# Patient Record
Sex: FEMALE | Race: BLACK OR AFRICAN AMERICAN | NOT HISPANIC OR LATINO | URBAN - METROPOLITAN AREA
[De-identification: names, ages, dates, MRNs, and addresses within clinical notes are randomized per-mention and may not be internally consistent; named-entity substitution may affect disease eponyms.]

---

## 2017-08-13 ENCOUNTER — INPATIENT (INPATIENT)
Facility: HOSPITAL | Age: 67
LOS: 3 days | Discharge: HOME HEALTH | End: 2017-08-17
Attending: HOSPITALIST | Admitting: HOSPITALIST
Payer: MEDICARE

## 2017-08-13 VITALS
SYSTOLIC BLOOD PRESSURE: 164 MMHG | RESPIRATION RATE: 22 BRPM | HEART RATE: 102 BPM | DIASTOLIC BLOOD PRESSURE: 103 MMHG | OXYGEN SATURATION: 100 % | TEMPERATURE: 99 F

## 2017-08-13 LAB
ALBUMIN SERPL ELPH-MCNC: 4.6 G/DL — SIGNIFICANT CHANGE UP (ref 3.3–5)
ALP SERPL-CCNC: 137 U/L — HIGH (ref 40–120)
ALT FLD-CCNC: 9 U/L — SIGNIFICANT CHANGE UP (ref 4–33)
AST SERPL-CCNC: 25 U/L — SIGNIFICANT CHANGE UP (ref 4–32)
BASE EXCESS BLDV CALC-SCNC: 2.6 MMOL/L — SIGNIFICANT CHANGE UP
BASOPHILS # BLD AUTO: 0.04 K/UL — SIGNIFICANT CHANGE UP (ref 0–0.2)
BASOPHILS NFR BLD AUTO: 0.5 % — SIGNIFICANT CHANGE UP (ref 0–2)
BILIRUB SERPL-MCNC: 0.4 MG/DL — SIGNIFICANT CHANGE UP (ref 0.2–1.2)
BLOOD GAS VENOUS - CREATININE: 0.73 MG/DL — SIGNIFICANT CHANGE UP (ref 0.5–1.3)
BUN SERPL-MCNC: 7 MG/DL — SIGNIFICANT CHANGE UP (ref 7–23)
CALCIUM SERPL-MCNC: 9.9 MG/DL — SIGNIFICANT CHANGE UP (ref 8.4–10.5)
CHLORIDE BLDV-SCNC: 106 MMOL/L — SIGNIFICANT CHANGE UP (ref 96–108)
CHLORIDE SERPL-SCNC: 103 MMOL/L — SIGNIFICANT CHANGE UP (ref 98–107)
CO2 SERPL-SCNC: 25 MMOL/L — SIGNIFICANT CHANGE UP (ref 22–31)
CREAT SERPL-MCNC: 0.84 MG/DL — SIGNIFICANT CHANGE UP (ref 0.5–1.3)
EOSINOPHIL # BLD AUTO: 1.51 K/UL — HIGH (ref 0–0.5)
EOSINOPHIL NFR BLD AUTO: 18 % — HIGH (ref 0–6)
GAS PNL BLDV: 142 MMOL/L — SIGNIFICANT CHANGE UP (ref 136–146)
GLUCOSE BLDV-MCNC: 99 — SIGNIFICANT CHANGE UP (ref 70–99)
GLUCOSE SERPL-MCNC: 92 MG/DL — SIGNIFICANT CHANGE UP (ref 70–99)
HCO3 BLDV-SCNC: 25 MMOL/L — SIGNIFICANT CHANGE UP (ref 20–27)
HCT VFR BLD CALC: 47.4 % — HIGH (ref 34.5–45)
HCT VFR BLDV CALC: 48.3 % — HIGH (ref 34.5–45)
HGB BLD-MCNC: 15.2 G/DL — SIGNIFICANT CHANGE UP (ref 11.5–15.5)
HGB BLDV-MCNC: 15.8 G/DL — HIGH (ref 11.5–15.5)
IMM GRANULOCYTES # BLD AUTO: 0.02 # — SIGNIFICANT CHANGE UP
IMM GRANULOCYTES NFR BLD AUTO: 0.2 % — SIGNIFICANT CHANGE UP (ref 0–1.5)
LACTATE BLDV-MCNC: 2 MMOL/L — SIGNIFICANT CHANGE UP (ref 0.5–2)
LYMPHOCYTES # BLD AUTO: 3.19 K/UL — SIGNIFICANT CHANGE UP (ref 1–3.3)
LYMPHOCYTES # BLD AUTO: 38.1 % — SIGNIFICANT CHANGE UP (ref 13–44)
MCHC RBC-ENTMCNC: 28 PG — SIGNIFICANT CHANGE UP (ref 27–34)
MCHC RBC-ENTMCNC: 32.1 % — SIGNIFICANT CHANGE UP (ref 32–36)
MCV RBC AUTO: 87.5 FL — SIGNIFICANT CHANGE UP (ref 80–100)
MONOCYTES # BLD AUTO: 0.52 K/UL — SIGNIFICANT CHANGE UP (ref 0–0.9)
MONOCYTES NFR BLD AUTO: 6.2 % — SIGNIFICANT CHANGE UP (ref 2–14)
NEUTROPHILS # BLD AUTO: 3.09 K/UL — SIGNIFICANT CHANGE UP (ref 1.8–7.4)
NEUTROPHILS NFR BLD AUTO: 37 % — LOW (ref 43–77)
NRBC # FLD: 0 — SIGNIFICANT CHANGE UP
PCO2 BLDV: 51 MMHG — SIGNIFICANT CHANGE UP (ref 41–51)
PH BLDV: 7.36 PH — SIGNIFICANT CHANGE UP (ref 7.32–7.43)
PLATELET # BLD AUTO: 254 K/UL — SIGNIFICANT CHANGE UP (ref 150–400)
PMV BLD: 10.1 FL — SIGNIFICANT CHANGE UP (ref 7–13)
PO2 BLDV: 31 MMHG — LOW (ref 35–40)
POTASSIUM BLDV-SCNC: 4 MMOL/L — SIGNIFICANT CHANGE UP (ref 3.4–4.5)
POTASSIUM SERPL-MCNC: 4.4 MMOL/L — SIGNIFICANT CHANGE UP (ref 3.5–5.3)
POTASSIUM SERPL-SCNC: 4.4 MMOL/L — SIGNIFICANT CHANGE UP (ref 3.5–5.3)
PROT SERPL-MCNC: 8 G/DL — SIGNIFICANT CHANGE UP (ref 6–8.3)
RBC # BLD: 5.42 M/UL — HIGH (ref 3.8–5.2)
RBC # FLD: 13 % — SIGNIFICANT CHANGE UP (ref 10.3–14.5)
SAO2 % BLDV: 51.4 % — LOW (ref 60–85)
SODIUM SERPL-SCNC: 144 MMOL/L — SIGNIFICANT CHANGE UP (ref 135–145)
WBC # BLD: 8.37 K/UL — SIGNIFICANT CHANGE UP (ref 3.8–10.5)
WBC # FLD AUTO: 8.37 K/UL — SIGNIFICANT CHANGE UP (ref 3.8–10.5)

## 2017-08-13 RX ORDER — IPRATROPIUM/ALBUTEROL SULFATE 18-103MCG
3 AEROSOL WITH ADAPTER (GRAM) INHALATION ONCE
Qty: 0 | Refills: 0 | Status: COMPLETED | OUTPATIENT
Start: 2017-08-13 | End: 2017-08-13

## 2017-08-13 RX ORDER — NICARDIPINE HYDROCHLORIDE 30 MG/1
2.5 CAPSULE, EXTENDED RELEASE ORAL
Qty: 40 | Refills: 0 | Status: DISCONTINUED | OUTPATIENT
Start: 2017-08-13 | End: 2017-08-13

## 2017-08-13 RX ORDER — MAGNESIUM SULFATE 500 MG/ML
2 VIAL (ML) INJECTION ONCE
Qty: 0 | Refills: 0 | Status: COMPLETED | OUTPATIENT
Start: 2017-08-13 | End: 2017-08-13

## 2017-08-13 RX ORDER — SODIUM CHLORIDE 9 MG/ML
1000 INJECTION INTRAMUSCULAR; INTRAVENOUS; SUBCUTANEOUS ONCE
Qty: 0 | Refills: 0 | Status: COMPLETED | OUTPATIENT
Start: 2017-08-13 | End: 2017-08-13

## 2017-08-13 RX ADMIN — Medication 3 MILLILITER(S): at 22:48

## 2017-08-13 RX ADMIN — Medication 60 MILLIGRAM(S): at 22:17

## 2017-08-13 RX ADMIN — Medication 3 MILLILITER(S): at 22:17

## 2017-08-13 RX ADMIN — Medication 3 MILLILITER(S): at 22:35

## 2017-08-13 RX ADMIN — Medication 50 GRAM(S): at 22:39

## 2017-08-13 RX ADMIN — SODIUM CHLORIDE 1000 MILLILITER(S): 9 INJECTION INTRAMUSCULAR; INTRAVENOUS; SUBCUTANEOUS at 22:35

## 2017-08-13 NOTE — ED ADULT NURSE NOTE - CHIEF COMPLAINT QUOTE
Pt arrives w/ c/o severe asthma exacerbation. Pt unable to speak in full sentences. Audible wheezes noted on arrival to triage. Respirations appear labored. Placed 4L nasal cannula.

## 2017-08-13 NOTE — ED ADULT TRIAGE NOTE - CHIEF COMPLAINT QUOTE
Pt arrives w/ c/o severe asthma exacerbation. Pt unable to speak in full sentences. Audible wheezes noted on arrival to triage. Respirations appear labored. Pt arrives w/ c/o severe asthma exacerbation. Pt unable to speak in full sentences. Audible wheezes noted on arrival to triage. Respirations appear labored. Placed 4L nasal cannula.

## 2017-08-13 NOTE — ED PROVIDER NOTE - ATTENDING CONTRIBUTION TO CARE
uriel: hx from pt and family.   hx of difficult to manage asthma, with adherence to pharm regimen ??.  Current meds include advair and GWEN. not currently on pred or montelukast. 2 prior ICU admits; no hx invasive ventilation. hx of monthly exacerbations.   Now with worsening sx past 2 weeks, incl wheeze/shortness of breath.   exam: after GWEN, pt talks with minimal difficulty. bilateral wheeze. , [id number].  labs and cxr pending. pt receiving BD, steroids, MgSO4

## 2017-08-13 NOTE — ED PROVIDER NOTE - MEDICAL DECISION MAKING DETAILS
66F w/ above history p/w productive cough, generalized weakness, wheezing, concerning for asthma exac  -labs, XR, nebs, steroids, magnesium, admit

## 2017-08-13 NOTE — ED PROVIDER NOTE - PROGRESS NOTE DETAILS
Feeling better following medications, respiratory exam improved, still has diffuse wheezing. XR clear, labs unremarkable, will admit, PMD not in system, d/w hospitatlist

## 2017-08-13 NOTE — ED PROVIDER NOTE - CONSTITUTIONAL, MLM
normal... speaking in short sentences, well nourished, awake, alert, oriented to person, place, time/situation

## 2017-08-13 NOTE — ED PROVIDER NOTE - PSH
H/O hand surgery  1980's right  H/O mastectomy, bilateral    S/P D&C (status post dilation and curettage)  x 2 many years ago

## 2017-08-13 NOTE — ED PROVIDER NOTE - OBJECTIVE STATEMENT
66F h/o HTN, HLD, BRCA s/p double mastectomy, asthma presenting with wheezing. Notes feeling generalized weakness, productive cough (white,) dyspnea, wheezing intermittently over past week, today has been using albuterol q3h at home. Has h/o 2 ICU stays, averages 1 ER visit/month for asthma. Denies F, abd pain, N/V/D.

## 2017-08-13 NOTE — ED ADULT NURSE NOTE - OBJECTIVE STATEMENT
Ambulatory accompanied by daughter complaining of SOB and non-compliance with medications. 89% in triage, now on 3L NC in trauma B and saturating @97%. Nebulizer ongoing. A/ox3, ambulatory at home. Patient states that she is feeling better now that she is getting treated. BL wheezes. MD at bedside. 22g IV inserted to L hand, fluids infusing well. Safety maintained, needs attended, will continue to monitor.

## 2017-08-14 DIAGNOSIS — R63.8 OTHER SYMPTOMS AND SIGNS CONCERNING FOOD AND FLUID INTAKE: ICD-10-CM

## 2017-08-14 DIAGNOSIS — Z29.9 ENCOUNTER FOR PROPHYLACTIC MEASURES, UNSPECIFIED: ICD-10-CM

## 2017-08-14 DIAGNOSIS — J45.901 UNSPECIFIED ASTHMA WITH (ACUTE) EXACERBATION: ICD-10-CM

## 2017-08-14 DIAGNOSIS — I10 ESSENTIAL (PRIMARY) HYPERTENSION: ICD-10-CM

## 2017-08-14 DIAGNOSIS — J45.909 UNSPECIFIED ASTHMA, UNCOMPLICATED: ICD-10-CM

## 2017-08-14 PROCEDURE — 71020: CPT | Mod: 26

## 2017-08-14 RX ORDER — IPRATROPIUM/ALBUTEROL SULFATE 18-103MCG
3 AEROSOL WITH ADAPTER (GRAM) INHALATION ONCE
Qty: 0 | Refills: 0 | Status: COMPLETED | OUTPATIENT
Start: 2017-08-14 | End: 2017-08-14

## 2017-08-14 RX ORDER — ENOXAPARIN SODIUM 100 MG/ML
40 INJECTION SUBCUTANEOUS DAILY
Qty: 0 | Refills: 0 | Status: DISCONTINUED | OUTPATIENT
Start: 2017-08-14 | End: 2017-08-17

## 2017-08-14 RX ORDER — IPRATROPIUM/ALBUTEROL SULFATE 18-103MCG
3 AEROSOL WITH ADAPTER (GRAM) INHALATION EVERY 6 HOURS
Qty: 0 | Refills: 0 | Status: DISCONTINUED | OUTPATIENT
Start: 2017-08-14 | End: 2017-08-17

## 2017-08-14 RX ADMIN — Medication 3 MILLILITER(S): at 15:31

## 2017-08-14 RX ADMIN — Medication 3 MILLILITER(S): at 11:42

## 2017-08-14 RX ADMIN — Medication 3 MILLILITER(S): at 18:48

## 2017-08-14 RX ADMIN — Medication 3 MILLILITER(S): at 22:39

## 2017-08-14 RX ADMIN — Medication 40 MILLIGRAM(S): at 16:05

## 2017-08-14 NOTE — H&P ADULT - FAMILY HISTORY
Father  Still living? Unknown  Family history of cancer, Age at diagnosis: Age Unknown  Family history of asthma, Age at diagnosis: Age Unknown

## 2017-08-14 NOTE — H&P ADULT - NSHPSOCIALHISTORY_GEN_ALL_CORE
Social History:    Marital Status:  (   )    ( x  ) Single    (   )    (  )   Occupation:   Lives with: (  ) alone  ( x ) children   (  ) spouse   (  ) parents  (  ) other    Substance Use (street drugs): ( x ) never used  (  ) other:  Tobacco Usage:  ( x  ) never smoked   (   ) former smoker   (   ) current smoker  (     ) pack year  (        ) last cigarette date  Alcohol Usage: none   Sexual History: n/a

## 2017-08-14 NOTE — PROVIDER CONTACT NOTE (OTHER) - REASON
Pt. reports coughing with SOB after ambulating the hallway
Blood pressure of 176/105. Oxygen saturation of 93.

## 2017-08-14 NOTE — H&P ADULT - ATTENDING COMMENTS
H&P appreciated.   she is less dyspneic but still with wheezing in all lung fields.  c/w steroids, nebs.  will f/u eosinophilia keeping in mind other potential etiologies (CEP, AEP, Churgg frank)

## 2017-08-14 NOTE — H&P ADULT - NSHPPHYSICALEXAM_GEN_ALL_CORE
Vital Signs Last 24 Hrs  T(C): 36.5 (08-14-17 @ 05:17), Max: 37.2 (08-13-17 @ 21:43)  T(F): 97.7 (08-14-17 @ 05:17), Max: 99 (08-13-17 @ 21:43)  HR: 88 (08-14-17 @ 05:17) (84 - 102)  BP: 152/76 (08-14-17 @ 05:17) (132/71 - 176/105)  BP(mean): --  RR: 17 (08-14-17 @ 05:17) (17 - 24)  SpO2: 98% (08-14-17 @ 05:17) (89% - 98%)    PHYSICAL EXAM:  GENERAL: NAD  HEAD:  Atraumatic, Normocephalic  EYES: EOMI, PERRLA, conjunctiva and sclera clear  ENMT: No tonsillar erythema, exudates, or enlargement; Moist mucous membranes, Good dentition, No lesions  NECK: Supple, No JVD, Normal thyroid  CHEST/LUNG: wheezes B/L, mastectomy scars   HEART: Regular rate and rhythm; No murmurs, rubs, or gallops  ABDOMEN: Soft, Nontender, Nondistended; Bowel sounds present  EXTREMITIES:  2+ Peripheral Pulses, No clubbing, cyanosis, or edema  LYMPH: No lymphadenopathy noted  SKIN: mastectomy scars

## 2017-08-14 NOTE — PROVIDER CONTACT NOTE (OTHER) - ASSESSMENT
Blood pressure of 176/105. Oxygen saturation of 93.
Pt. AOx4, NC 2L applied, O2 sat. 97%, coughing with productive sputum production

## 2017-08-14 NOTE — H&P ADULT - NSHPREVIEWOFSYSTEMS_GEN_ALL_CORE
endorses SOB, possible cough     denies fever, chills, night sweats, nasal congestion, chest pain, palpations, nausea/vomiting, abd. pain, diarrhea, constipation, dysuria, urinary frequency, leg swelling, joint pain, and rashes.

## 2017-08-14 NOTE — H&P ADULT - NSHPLABSRESULTS_GEN_ALL_CORE
08-13    144  |  103  |  7   ----------------------------<  92  4.4   |  25  |  0.84    Ca    9.9      13 Aug 2017 22:26    TPro  8.0  /  Alb  4.6  /  TBili  0.4  /  DBili  x   /  AST  25  /  ALT  9   /  AlkPhos  137<H>  08-13               15.2   8.37  )-----------( 254      ( 13 Aug 2017 22:26 )             47.4     18% eosinophils    VBG:  pH 7.36  pCO2 venous 51 (normal)   pO2 venous 31 (low)  HCO3 25 (normal)

## 2017-08-14 NOTE — H&P ADULT - ASSESSMENT
66F w/ BRCA s/p B/L mastectomy in 2015, HTN, HLD presenting with wheezing and SOB, 2/2 possible to asthma exacerbation

## 2017-08-14 NOTE — H&P ADULT - HISTORY OF PRESENT ILLNESS
66 F with hx of BRCA (s/p B/L mastectomy 2015), HTN, HLD 66 F with hx of BRCA (s/p B/L mastectomy 2015), HTN, HLD presents with sudden shortness of breath and increased respirations possibly 2/2 asthma.  Pt reports that she was in her usual state of health on Friday when she got up and suddenly felt SOB and began to breathe very quickly.  Pt states that she immediately started using her albuterol inhaler (two puffs) and albuterol nebulizer, and given that she felt an impending state of doom, she called her daughter who brought her into the ED.      Regarding her asthma, she reports that has never been intubated, but that she has been hospitalized before.  She uses Dulera and Albuterol every day, and was nebulous about whether or not she wakes up at night, denying and then affirming.  Pt reports that her triggers include exercise, allergens, weather changes, fumes, cigarette smoke.  The pt states that her building is currently being renovated and that her breathing has been worse since it begun - there is dust from the construction, paint fumes, and trees are being cut.      Otherwise, no other constitutional sxs - no f/c/n/v/diarrhea/constipation/dysuria    In the ED:  T 99, , 164/103 66 F from [facility] with hx of BRCA (s/p B/L mastectomy 2015), HTN, HLD presents with sudden shortness of breath and increased respirations possibly 2/2 asthma.  Pt reports that she was in her usual state of health on Friday when she got up, opened the window and "dust came in" and suddenly felt SOB and began to breathe very quickly.  Pt states that she immediately started using her albuterol inhaler (two puffs) and albuterol nebulizer, and given that she felt an impending state of doom, she called her daughter who brought her into the ED.      Regarding her asthma, she reports that has never been intubated, but that she has been hospitalized before - has not been hospitalized for asthma in 2 years, goes to see PMD in clinic.  She uses Dulera and Albuterol every day, has used prednisone in the past, and was nebulous about whether or not she wakes up at night, denying and then affirming.  Pt reports that her triggers include exercise, allergens, weather changes, fumes, cigarette smoke.  The pt states that her building is currently being renovated and that her breathing has been worse since it begun - there is dust from the construction, paint fumes, and trees are being cut.      Otherwise, no other constitutional sxs - no f/c/n/v/diarrhea/constipation/dysuria    In the ED:  T 99, , 164/103  Magnesium sulfate 2 g, Prednisone 60 mg one dose

## 2017-08-14 NOTE — H&P ADULT - PROBLEM SELECTOR PLAN 1
As per pt, has hx of asthma exacerbation and known triggers since 2007  - c/w Duonebs and supportive care   - O2 NC   - will touch base w/ outpatient PMD Dr. DEVANTE Worrell  - can consult pulm if necessary As per pt, has hx of asthma exacerbation and known triggers since 2007, possibly c/b her anxiety   - c/w Duonebs and supportive care   - O2 NC   - will touch base w/ outpatient PMD Dr. DEVANTE Worrell  - would benefit from outpatient pulmonologist - unlikely to need house pulm at the time As per pt, has hx of asthma exacerbation and known triggers since 2007, possibly c/b her anxiety - ddx of wheezing is extensive including anaphylaxis, vocal cord edema, laryngeal/tracheal stenosis, goiter, post nasal drip, parasites, and occupational asthma - given her picture, asthma is most likely  - c/w Duonebs and supportive care   - O2 NC   - will touch base w/ outpatient PMD Dr. DEVANTE Worrell  - would benefit from outpatient pulmonologist - unlikely to need house pulm at the time

## 2017-08-15 ENCOUNTER — TRANSCRIPTION ENCOUNTER (OUTPATIENT)
Age: 67
End: 2017-08-15

## 2017-08-15 LAB
ALBUMIN SERPL ELPH-MCNC: 4.3 G/DL — SIGNIFICANT CHANGE UP (ref 3.3–5)
ALP SERPL-CCNC: 119 U/L — SIGNIFICANT CHANGE UP (ref 40–120)
ALT FLD-CCNC: 8 U/L — SIGNIFICANT CHANGE UP (ref 4–33)
AST SERPL-CCNC: 20 U/L — SIGNIFICANT CHANGE UP (ref 4–32)
BASOPHILS # BLD AUTO: 0.02 K/UL — SIGNIFICANT CHANGE UP (ref 0–0.2)
BASOPHILS NFR BLD AUTO: 0.3 % — SIGNIFICANT CHANGE UP (ref 0–2)
BILIRUB SERPL-MCNC: 0.3 MG/DL — SIGNIFICANT CHANGE UP (ref 0.2–1.2)
BUN SERPL-MCNC: 11 MG/DL — SIGNIFICANT CHANGE UP (ref 7–23)
CALCIUM SERPL-MCNC: 9.7 MG/DL — SIGNIFICANT CHANGE UP (ref 8.4–10.5)
CHLORIDE SERPL-SCNC: 102 MMOL/L — SIGNIFICANT CHANGE UP (ref 98–107)
CO2 SERPL-SCNC: 23 MMOL/L — SIGNIFICANT CHANGE UP (ref 22–31)
CREAT SERPL-MCNC: 0.9 MG/DL — SIGNIFICANT CHANGE UP (ref 0.5–1.3)
EOSINOPHIL # BLD AUTO: 0.03 K/UL — SIGNIFICANT CHANGE UP (ref 0–0.5)
EOSINOPHIL NFR BLD AUTO: 0.4 % — SIGNIFICANT CHANGE UP (ref 0–6)
GLUCOSE SERPL-MCNC: 133 MG/DL — HIGH (ref 70–99)
HCT VFR BLD CALC: 45.5 % — HIGH (ref 34.5–45)
HGB BLD-MCNC: 14.6 G/DL — SIGNIFICANT CHANGE UP (ref 11.5–15.5)
IMM GRANULOCYTES # BLD AUTO: 0.03 # — SIGNIFICANT CHANGE UP
IMM GRANULOCYTES NFR BLD AUTO: 0.4 % — SIGNIFICANT CHANGE UP (ref 0–1.5)
LYMPHOCYTES # BLD AUTO: 2.26 K/UL — SIGNIFICANT CHANGE UP (ref 1–3.3)
LYMPHOCYTES # BLD AUTO: 32.4 % — SIGNIFICANT CHANGE UP (ref 13–44)
MAGNESIUM SERPL-MCNC: 2.2 MG/DL — SIGNIFICANT CHANGE UP (ref 1.6–2.6)
MCHC RBC-ENTMCNC: 28.2 PG — SIGNIFICANT CHANGE UP (ref 27–34)
MCHC RBC-ENTMCNC: 32.1 % — SIGNIFICANT CHANGE UP (ref 32–36)
MCV RBC AUTO: 88 FL — SIGNIFICANT CHANGE UP (ref 80–100)
MONOCYTES # BLD AUTO: 0.52 K/UL — SIGNIFICANT CHANGE UP (ref 0–0.9)
MONOCYTES NFR BLD AUTO: 7.4 % — SIGNIFICANT CHANGE UP (ref 2–14)
NEUTROPHILS # BLD AUTO: 4.12 K/UL — SIGNIFICANT CHANGE UP (ref 1.8–7.4)
NEUTROPHILS NFR BLD AUTO: 59.1 % — SIGNIFICANT CHANGE UP (ref 43–77)
NRBC # FLD: 0 — SIGNIFICANT CHANGE UP
PHOSPHATE SERPL-MCNC: 3.7 MG/DL — SIGNIFICANT CHANGE UP (ref 2.5–4.5)
PLATELET # BLD AUTO: 256 K/UL — SIGNIFICANT CHANGE UP (ref 150–400)
PMV BLD: 10.4 FL — SIGNIFICANT CHANGE UP (ref 7–13)
POTASSIUM SERPL-MCNC: 4.1 MMOL/L — SIGNIFICANT CHANGE UP (ref 3.5–5.3)
POTASSIUM SERPL-SCNC: 4.1 MMOL/L — SIGNIFICANT CHANGE UP (ref 3.5–5.3)
PROT SERPL-MCNC: 7.4 G/DL — SIGNIFICANT CHANGE UP (ref 6–8.3)
RBC # BLD: 5.17 M/UL — SIGNIFICANT CHANGE UP (ref 3.8–5.2)
RBC # FLD: 13 % — SIGNIFICANT CHANGE UP (ref 10.3–14.5)
SODIUM SERPL-SCNC: 141 MMOL/L — SIGNIFICANT CHANGE UP (ref 135–145)
WBC # BLD: 6.98 K/UL — SIGNIFICANT CHANGE UP (ref 3.8–10.5)
WBC # FLD AUTO: 6.98 K/UL — SIGNIFICANT CHANGE UP (ref 3.8–10.5)

## 2017-08-15 PROCEDURE — 99233 SBSQ HOSP IP/OBS HIGH 50: CPT | Mod: GC

## 2017-08-15 RX ORDER — BUDESONIDE AND FORMOTEROL FUMARATE DIHYDRATE 160; 4.5 UG/1; UG/1
2 AEROSOL RESPIRATORY (INHALATION)
Qty: 0 | Refills: 0 | Status: DISCONTINUED | OUTPATIENT
Start: 2017-08-15 | End: 2017-08-16

## 2017-08-15 RX ADMIN — Medication 3 MILLILITER(S): at 09:19

## 2017-08-15 RX ADMIN — BUDESONIDE AND FORMOTEROL FUMARATE DIHYDRATE 2 PUFF(S): 160; 4.5 AEROSOL RESPIRATORY (INHALATION) at 22:55

## 2017-08-15 RX ADMIN — Medication 3 MILLILITER(S): at 21:45

## 2017-08-15 RX ADMIN — Medication 3 MILLILITER(S): at 04:14

## 2017-08-15 RX ADMIN — ENOXAPARIN SODIUM 40 MILLIGRAM(S): 100 INJECTION SUBCUTANEOUS at 12:35

## 2017-08-15 RX ADMIN — Medication 40 MILLIGRAM(S): at 05:28

## 2017-08-15 RX ADMIN — Medication 100 MILLIGRAM(S): at 16:32

## 2017-08-15 RX ADMIN — Medication 3 MILLILITER(S): at 16:07

## 2017-08-15 NOTE — DISCHARGE NOTE ADULT - MEDICATION SUMMARY - MEDICATIONS TO TAKE
I will START or STAY ON the medications listed below when I get home from the hospital:    nebulizer  -- 1 nebulizer machine to use with albuterol   -- Indication: For Asthma     Dulera 100 mcg-5 mcg/inh inhalation aerosol  -- 2 puff(s) inhaled 2 times a day  -- Indication: For Asthma     ProAir HFA 90 mcg/inh inhalation aerosol  -- 2 puff(s) inhaled prn  -- Indication: For Asthma     amLODIPine 5 mg oral tablet  -- 1 tab(s) by mouth once a day  -- Indication: For HTN (hypertension)    montelukast 10 mg oral tablet  -- 1 tab(s) by mouth once a day  -- It is very important that you take or use this exactly as directed.  Do not skip doses or discontinue unless directed by your doctor.    -- Indication: For Asthma

## 2017-08-15 NOTE — PROGRESS NOTE ADULT - ATTENDING COMMENTS
she wanted to leave but now agrees to stay.  she still has significant bronchospasm with a PF just over 50% of her best.    will add LABA and ICS. proper inhaler technique was demonstrated.

## 2017-08-15 NOTE — DISCHARGE NOTE ADULT - HOSPITAL COURSE
HPI:  66 F from [facility] with hx of BRCA (s/p B/L mastectomy 2015), HTN, HLD presents with sudden shortness of breath and increased respirations possibly 2/2 asthma.  Pt reports that she was in her usual state of health on Friday when she got up, opened the window and "dust came in" and suddenly felt SOB and began to breathe very quickly.  Pt states that she immediately started using her albuterol inhaler (two puffs) and albuterol nebulizer, and given that she felt an impending state of doom, she called her daughter who brought her into the ED.      Regarding her asthma, she reports that has never been intubated, but that she has been hospitalized before - has not been hospitalized for asthma in 2 years, goes to see PMD in clinic.  She uses Dulera and Albuterol every day, has used prednisone in the past, and was nebulous about whether or not she wakes up at night, denying and then affirming.  Pt reports that her triggers include exercise, allergens, weather changes, fumes, cigarette smoke.  The pt states that her building is currently being renovated and that her breathing has been worse since it begun - there is dust from the construction, paint fumes, and trees are being cut.      Otherwise, no other constitutional sxs - no f/c/n/v/diarrhea/constipation/dysuria    Hospital course:  The pt was admitted for suspected asthma exacerbation with possible complications from a URI.  The pt has a known outpatient pulmonologist, Dr. Worrell, with whom she follows regularly.  In the hospital, the pt received 60 mg prednisone and was switched to 40 mg prednisone to treat suspected asthma exacerbation.  The pt also received supportive care with nebulizer treatment and supplemental oxygen, to which the pt did not respond, as she continued to have bilateral inspiratory and expiratory wheezes.  The pt refused RVP assessment.  The pt was not clinically stable for discharge, but left AMA. HPI:  66 F from [facility] with hx of BRCA (s/p B/L mastectomy 2015), HTN, HLD presents with sudden shortness of breath and increased respirations possibly 2/2 asthma.  Pt reports that she was in her usual state of health on Friday when she got up, opened the window and "dust came in" and suddenly felt SOB and began to breathe very quickly.  Pt states that she immediately started using her albuterol inhaler (two puffs) and albuterol nebulizer, and given that she felt an impending state of doom, she called her daughter who brought her into the ED.      Regarding her asthma, she reports that has never been intubated, but that she has been hospitalized before - has not been hospitalized for asthma in 2 years, goes to see PMD in clinic.  She uses Dulera and Albuterol every day, has used prednisone in the past, and was nebulous about whether or not she wakes up at night, denying and then affirming.  Pt reports that her triggers include exercise, allergens, weather changes, fumes, cigarette smoke.  The pt states that her building is currently being renovated and that her breathing has been worse since it begun - there is dust from the construction, paint fumes, and trees are being cut.      Otherwise, no other constitutional sxs - no f/c/n/v/diarrhea/constipation/dysuria    Hospital course:  The pt was admitted for suspected asthma exacerbation with possible complications from a URI.  The pt has a known outpatient pulmonologist, Dr. Worrell, with whom she follows regularly, and diagnosed her with asthma based on PFT studies.  In the hospital, the pt received 60 mg prednisone and was switched to 40 mg prednisone to treat suspected asthma exacerbation.  The pt also received supportive care with nebulizer treatment and supplemental oxygen, which reduced her wheezing throughout the hospital stay.  The pt was started back on Singulair and given refills for her home medications, including a prescription for a new nebulizer machine with a prescription for Albuterol 0.083%.  On the day of discharge, the pt's peak flow was 300 (improved from 200), had significantly reduced wheezing and good air exchange on examination.  The pt was stable for discharge, and was sent home with recommendations for outpatient follow up and avoidance of asthmatic triggers. HPI:  66 F from [facility] with hx of BRCA (s/p B/L mastectomy 2015), HTN, HLD presents with sudden shortness of breath and increased respirations possibly 2/2 asthma.  Pt reports that she was in her usual state of health on Friday when she got up, opened the window and "dust came in" and suddenly felt SOB and began to breathe very quickly.  Pt states that she immediately started using her albuterol inhaler (two puffs) and albuterol nebulizer, and given that she felt an impending state of doom, she called her daughter who brought her into the ED.      Regarding her asthma, she reports that has never been intubated, but that she has been hospitalized before - has not been hospitalized for asthma in 2 years, goes to see PMD in clinic.  She uses Dulera and Albuterol every day, has used prednisone in the past, and was nebulous about whether or not she wakes up at night, denying and then affirming.  Pt reports that her triggers include exercise, allergens, weather changes, fumes, cigarette smoke.  The pt states that her building is currently being renovated and that her breathing has been worse since it begun - there is dust from the construction, paint fumes, and trees are being cut.      Otherwise, no other constitutional sxs - no f/c/n/v/diarrhea/constipation/dysuria    Hospital course:  The pt was admitted for suspected asthma exacerbation with possible complications from a URI.  The pt has a known outpatient pulmonologist, Dr. Worrell, with whom she follows regularly, and diagnosed her with asthma based on PFT studies.  In the hospital, the pt received 60 mg prednisone and was switched to 40 mg prednisone to treat suspected asthma exacerbation.  The pt also received supportive care with nebulizer treatment and supplemental oxygen, which reduced her wheezing throughout the hospital stay.  The pt was started back on Singulair and given refills for her home medications, including a prescription for a new nebulizer machine with a prescription for Albuterol 0.083%.  On the day of discharge, the pt's peak flow was 300 (improved from 200), had significantly reduced wheezing and good air exchange on examination.  The pt was stable for discharge, and was sent home with recommendations for outpatient follow up and avoidance of asthmatic triggers.    medicine attending addendum- she was admitted with status asthmaticus. finally "broke" after 4 days of nebs, steroids.  proper MDI technique was reviewed and treatment plans discussed based on disease activity.

## 2017-08-15 NOTE — DISCHARGE NOTE ADULT - ADDITIONAL INSTRUCTIONS
Please follow up with Dr. Worrell.  Please take your medications as prescribed. Please follow up with Dr. Worrell.  Please take your medications as prescribed.  Please remain indoors in air conditioning away from irritants and allergens, especially on hot and humid days.  Please continue to use the peak flow and if your score is less than 200, please return to the hospital.  Please use your asthma inhalers as instructed.

## 2017-08-15 NOTE — DISCHARGE NOTE ADULT - CARE PLAN
Principal Discharge DX:	Asthma exacerbation  Goal:	resolve  Instructions for follow-up, activity and diet:	You were admitted with a suspected asthma exacerbation.  You were treated with prednisone 40 mg and nebulizer treatment with supplemental oxygen.  Please follow up with your outpatient pulmonologist.  Secondary Diagnosis:	HTN (hypertension)  Goal:	maintain  Instructions for follow-up, activity and diet:	Please follow up with your outpatient primary care physician.  Please take your medications as prescribed. Principal Discharge DX:	Asthma exacerbation  Goal:	resolve  Instructions for follow-up, activity and diet:	You were admitted with a suspected asthma exacerbation.  You were treated with prednisone 40 mg and nebulizer treatment with supplemental oxygen.  Please take your medications as prescribed.  Please follow up with your outpatient pulmonologist.  Secondary Diagnosis:	HTN (hypertension)  Goal:	maintain  Instructions for follow-up, activity and diet:	Please follow up with your outpatient primary care physician.  Please take your medications as prescribed.

## 2017-08-15 NOTE — DISCHARGE NOTE ADULT - COMMUNITY RESOURCES
EMELIAP aide from  [facility]  [phone] MAGDY aide from  [facility]  [phone] [facility]  [phone] MAGDY aide from  [facility]  [phone] / [facility]  [phone]

## 2017-08-15 NOTE — PROGRESS NOTE ADULT - SUBJECTIVE AND OBJECTIVE BOX
Subjective:    Patient is a 66y old  Female who presents with a chief complaint of asthmatic wheezing (14 Aug 2017 09:03)      HPI:       MEDICATIONS  (STANDING):  ALBUTerol/ipratropium for Nebulization 3 milliLiter(s) Nebulizer every 6 hours  predniSONE   Tablet 40 milliGRAM(s) Oral daily  enoxaparin Injectable 40 milliGRAM(s) SubCutaneous daily    MEDICATIONS  (PRN):        Objective:    Vitals: Vital Signs Last 24 Hrs  T(C): 36.7 (08-15-17 @ 05:11), Max: 37.1 (08-14-17 @ 22:37)  T(F): 98.1 (08-15-17 @ 05:11), Max: 98.8 (08-14-17 @ 22:37)  HR: 73 (08-15-17 @ 05:11) (72 - 98)  BP: 125/69 (08-15-17 @ 05:11) (125/69 - 152/92)  BP(mean): --  RR: 17 (08-15-17 @ 05:11) (17 - 19)  SpO2: 98% (08-15-17 @ 05:11) (97% - 99%)            I&O's Summary      PHYSICAL EXAM:  GENERAL: NAD, well-groomed, well-developed  CHEST/LUNG: b/l inspiratory and expiratory wheezes   HEART: Regular rate and rhythm; No murmurs, rubs, or gallops  ABDOMEN: Soft, Nontender, Nondistended; Bowel sounds present                                        LABS:  08-15    141  |  102  |  11  ----------------------------<  133<H>  4.1   |  23  |  0.90  08-13    144  |  103  |  7   ----------------------------<  92  4.4   |  25  |  0.84    Ca    9.7      15 Aug 2017 06:15  Ca    9.9      13 Aug 2017 22:26  Phos  3.7     08-15  Mg     2.2     08-15    TPro  7.4  /  Alb  4.3  /  TBili  0.3  /  DBili  x   /  AST  20  /  ALT  8   /  AlkPhos  119  08-15  TPro  8.0  /  Alb  4.6  /  TBili  0.4  /  DBili  x   /  AST  25  /  ALT  9   /  AlkPhos  137<H>  08-13               14.6   6.98  )-----------( 256      ( 15 Aug 2017 06:15 )             45.5                         15.2   8.37  )-----------( 254      ( 13 Aug 2017 22:26 )             47.4

## 2017-08-15 NOTE — DISCHARGE NOTE ADULT - CONDITION (STATED IN TERMS THAT PERMIT A SPECIFIC MEASURABLE COMPARISON WITH CONDITION ON ADMISSION):
Pt had an asthma exacerbation possibly c/b URI.  Pt was not stable for discharge, as she continued to have b/l inspiratory and expiratory wheezes, but pt opted to leave AMA. Pt had an asthma exacerbation possibly c/b URI.  Pt was stable for discharge with outpatient pulmonary followup.

## 2017-08-15 NOTE — PROGRESS NOTE ADULT - PROBLEM SELECTOR PLAN 1
Pt has hx of asthma diagnosed in 07-08 by pulmonologist Dr. Worrell; possibly c/b URI given that pt is coughing, but has refused RVP for 2 days to confirm - + RVP would explain wheezing not responsive to bronchodilator treatment  - c/w Joel and supportive care   - O2 NC

## 2017-08-15 NOTE — DISCHARGE NOTE ADULT - PATIENT PORTAL LINK FT
“You can access the Follow[facility] Patient Portal, offered by [facility], by registering with the following website: [email or web address]”

## 2017-08-15 NOTE — DISCHARGE NOTE ADULT - NSFTFSERV1RD_GEN_ALL_CORE
observation and assessment observation and assessment/medication teaching and assessment/teaching and training

## 2017-08-16 LAB
BASOPHILS # BLD AUTO: 0.04 K/UL — SIGNIFICANT CHANGE UP (ref 0–0.2)
BASOPHILS NFR BLD AUTO: 0.6 % — SIGNIFICANT CHANGE UP (ref 0–2)
BUN SERPL-MCNC: 11 MG/DL — SIGNIFICANT CHANGE UP (ref 7–23)
CALCIUM SERPL-MCNC: 9.2 MG/DL — SIGNIFICANT CHANGE UP (ref 8.4–10.5)
CHLORIDE SERPL-SCNC: 102 MMOL/L — SIGNIFICANT CHANGE UP (ref 98–107)
CO2 SERPL-SCNC: 25 MMOL/L — SIGNIFICANT CHANGE UP (ref 22–31)
CREAT SERPL-MCNC: 0.83 MG/DL — SIGNIFICANT CHANGE UP (ref 0.5–1.3)
EOSINOPHIL # BLD AUTO: 0.62 K/UL — HIGH (ref 0–0.5)
EOSINOPHIL NFR BLD AUTO: 9.9 % — HIGH (ref 0–6)
GLUCOSE SERPL-MCNC: 97 MG/DL — SIGNIFICANT CHANGE UP (ref 70–99)
HCT VFR BLD CALC: 42.7 % — SIGNIFICANT CHANGE UP (ref 34.5–45)
HGB BLD-MCNC: 13.7 G/DL — SIGNIFICANT CHANGE UP (ref 11.5–15.5)
IMM GRANULOCYTES # BLD AUTO: 0.01 # — SIGNIFICANT CHANGE UP
IMM GRANULOCYTES NFR BLD AUTO: 0.2 % — SIGNIFICANT CHANGE UP (ref 0–1.5)
LYMPHOCYTES # BLD AUTO: 2.58 K/UL — SIGNIFICANT CHANGE UP (ref 1–3.3)
LYMPHOCYTES # BLD AUTO: 41 % — SIGNIFICANT CHANGE UP (ref 13–44)
MAGNESIUM SERPL-MCNC: 2.1 MG/DL — SIGNIFICANT CHANGE UP (ref 1.6–2.6)
MCHC RBC-ENTMCNC: 28.5 PG — SIGNIFICANT CHANGE UP (ref 27–34)
MCHC RBC-ENTMCNC: 32.1 % — SIGNIFICANT CHANGE UP (ref 32–36)
MCV RBC AUTO: 88.8 FL — SIGNIFICANT CHANGE UP (ref 80–100)
MONOCYTES # BLD AUTO: 0.49 K/UL — SIGNIFICANT CHANGE UP (ref 0–0.9)
MONOCYTES NFR BLD AUTO: 7.8 % — SIGNIFICANT CHANGE UP (ref 2–14)
NEUTROPHILS # BLD AUTO: 2.55 K/UL — SIGNIFICANT CHANGE UP (ref 1.8–7.4)
NEUTROPHILS NFR BLD AUTO: 40.5 % — LOW (ref 43–77)
NRBC # FLD: 0 — SIGNIFICANT CHANGE UP
PHOSPHATE SERPL-MCNC: 4.1 MG/DL — SIGNIFICANT CHANGE UP (ref 2.5–4.5)
PLATELET # BLD AUTO: 229 K/UL — SIGNIFICANT CHANGE UP (ref 150–400)
PMV BLD: 10.3 FL — SIGNIFICANT CHANGE UP (ref 7–13)
POTASSIUM SERPL-MCNC: 3.8 MMOL/L — SIGNIFICANT CHANGE UP (ref 3.5–5.3)
POTASSIUM SERPL-SCNC: 3.8 MMOL/L — SIGNIFICANT CHANGE UP (ref 3.5–5.3)
RBC # BLD: 4.81 M/UL — SIGNIFICANT CHANGE UP (ref 3.8–5.2)
RBC # FLD: 13.2 % — SIGNIFICANT CHANGE UP (ref 10.3–14.5)
SODIUM SERPL-SCNC: 141 MMOL/L — SIGNIFICANT CHANGE UP (ref 135–145)
WBC # BLD: 6.29 K/UL — SIGNIFICANT CHANGE UP (ref 3.8–10.5)
WBC # FLD AUTO: 6.29 K/UL — SIGNIFICANT CHANGE UP (ref 3.8–10.5)

## 2017-08-16 PROCEDURE — 12345: CPT | Mod: NC

## 2017-08-16 PROCEDURE — 99232 SBSQ HOSP IP/OBS MODERATE 35: CPT | Mod: GC

## 2017-08-16 RX ORDER — BUDESONIDE, MICRONIZED 100 %
0.5 POWDER (GRAM) MISCELLANEOUS
Qty: 0 | Refills: 0 | Status: DISCONTINUED | OUTPATIENT
Start: 2017-08-16 | End: 2017-08-17

## 2017-08-16 RX ORDER — ALBUTEROL 90 UG/1
2.5 AEROSOL, METERED ORAL EVERY 4 HOURS
Qty: 0 | Refills: 0 | Status: DISCONTINUED | OUTPATIENT
Start: 2017-08-16 | End: 2017-08-17

## 2017-08-16 RX ORDER — BUDESONIDE, MICRONIZED 100 %
0.25 POWDER (GRAM) MISCELLANEOUS
Qty: 0 | Refills: 0 | Status: DISCONTINUED | OUTPATIENT
Start: 2017-08-16 | End: 2017-08-16

## 2017-08-16 RX ORDER — MONTELUKAST 4 MG/1
10 TABLET, CHEWABLE ORAL DAILY
Qty: 0 | Refills: 0 | Status: DISCONTINUED | OUTPATIENT
Start: 2017-08-16 | End: 2017-08-17

## 2017-08-16 RX ADMIN — Medication 3 MILLILITER(S): at 10:52

## 2017-08-16 RX ADMIN — MONTELUKAST 10 MILLIGRAM(S): 4 TABLET, CHEWABLE ORAL at 11:41

## 2017-08-16 RX ADMIN — Medication 3 MILLILITER(S): at 03:25

## 2017-08-16 RX ADMIN — Medication 100 MILLIGRAM(S): at 08:55

## 2017-08-16 RX ADMIN — Medication 3 MILLILITER(S): at 20:50

## 2017-08-16 RX ADMIN — Medication 40 MILLIGRAM(S): at 05:40

## 2017-08-16 RX ADMIN — Medication 0.5 MILLIGRAM(S): at 20:56

## 2017-08-16 RX ADMIN — BUDESONIDE AND FORMOTEROL FUMARATE DIHYDRATE 2 PUFF(S): 160; 4.5 AEROSOL RESPIRATORY (INHALATION) at 08:55

## 2017-08-16 RX ADMIN — Medication 3 MILLILITER(S): at 15:51

## 2017-08-16 NOTE — PROGRESS NOTE ADULT - ATTENDING COMMENTS
peak flow is still only 250 (best is 450) and she still has wheezing albeit decreased.  will convert MDI's to nebs as  question of technique.  add LTRA.

## 2017-08-16 NOTE — PROGRESS NOTE ADULT - SUBJECTIVE AND OBJECTIVE BOX
Subjective:    Patient is a 66y old  Female who presents with a chief complaint of asthmatic wheezing (15 Aug 2017 08:33) - no overnight events, bedside peak flow 200    MEDICATIONS  (STANDING):  ALBUTerol/ipratropium for Nebulization 3 milliLiter(s) Nebulizer every 6 hours  predniSONE   Tablet 40 milliGRAM(s) Oral daily  enoxaparin Injectable 40 milliGRAM(s) SubCutaneous daily  buDESOnide  80 MICROgram(s)/formoterol 4.5 MICROgram(s) Inhaler 2 Puff(s) Inhalation two times a day    MEDICATIONS  (PRN):  benzonatate 100 milliGRAM(s) Oral three times a day PRN Cough    Objective:    Vitals: Vital Signs Last 24 Hrs  T(C): 37.2 (08-16-17 @ 05:14), Max: 37.2 (08-15-17 @ 22:28)  T(F): 98.9 (08-16-17 @ 05:14), Max: 99 (08-15-17 @ 22:28)  HR: 71 (08-16-17 @ 05:14) (61 - 97)  BP: 137/67 (08-16-17 @ 05:14) (137/67 - 156/87)  BP(mean): --  RR: 18 (08-16-17 @ 05:14) (18 - 19)  SpO2: 100% (08-16-17 @ 05:14) (93% - 100%)            I&O's Summary      PHYSICAL EXAM:  GENERAL: NAD, well-groomed, well-developed  HEAD:  Atraumatic, Normocephalic  CHEST/LUNG: decreased insp/exp wheezes b/l as compared to yesterday   HEART: Regular rate and rhythm; No murmurs, rubs, or gallops  ABDOMEN: Soft, Nontender, Nondistended; Bowel sounds present                                        LABS:  08-16    141  |  102  |  11  ----------------------------<  97  3.8   |  25  |  0.83  08-15    141  |  102  |  11  ----------------------------<  133<H>  4.1   |  23  |  0.90  08-13    144  |  103  |  7   ----------------------------<  92  4.4   |  25  |  0.84    Ca    9.2      16 Aug 2017 05:20  Ca    9.7      15 Aug 2017 06:15  Ca    9.9      13 Aug 2017 22:26  Phos  4.1     08-16  Mg     2.1     08-16    TPro  7.4  /  Alb  4.3  /  TBili  0.3  /  DBili  x   /  AST  20  /  ALT  8   /  AlkPhos  119  08-15  TPro  8.0  /  Alb  4.6  /  TBili  0.4  /  DBili  x   /  AST  25  /  ALT  9   /  AlkPhos  137<H>  08-13                                              13.7   6.29  )-----------( 229      ( 16 Aug 2017 05:20 )             42.7                         14.6   6.98  )-----------( 256      ( 15 Aug 2017 06:15 )             45.5                         15.2   8.37  )-----------( 254      ( 13 Aug 2017 22:26 )             47.4     CAPILLARY BLOOD GLUCOSE          RADIOLOGY & ADDITIONAL TESTS:    Imaging Personally Reviewed:  [ ] YES  [ ] NO      Consultants involved in case:   Consultant(s) Notes Reviewed:  [ ] YES  [ ] NO:   Care Discussed with Consultants/Other Providers [ ] YES  [ ] NO Name band;

## 2017-08-16 NOTE — PROGRESS NOTE ADULT - PROBLEM SELECTOR PLAN 1
Pt has hx of asthma diagnosed in 07-08 by pulmonologist Dr. Worrell; clinically improved but still wheezing; peak flow at 200  - c/w Duonebs, added LABA/ICS and supportive care   - O2 NC

## 2017-08-17 VITALS — OXYGEN SATURATION: 94 %

## 2017-08-17 PROCEDURE — 99239 HOSP IP/OBS DSCHRG MGMT >30: CPT

## 2017-08-17 RX ORDER — ALBUTEROL 90 UG/1
1 AEROSOL, METERED ORAL
Qty: 1 | Refills: 1 | OUTPATIENT
Start: 2017-08-17 | End: 2017-10-15

## 2017-08-17 RX ORDER — MOMETASONE FUROATE AND FORMOTEROL FUMARATE DIHYDRATE 200; 5 UG/1; UG/1
2 AEROSOL RESPIRATORY (INHALATION)
Qty: 1 | Refills: 1 | OUTPATIENT
Start: 2017-08-17 | End: 2017-10-15

## 2017-08-17 RX ORDER — ALBUTEROL 90 UG/1
2.5 AEROSOL, METERED ORAL
Qty: 1 | Refills: 1 | OUTPATIENT
Start: 2017-08-17 | End: 2017-10-15

## 2017-08-17 RX ORDER — MONTELUKAST 4 MG/1
1 TABLET, CHEWABLE ORAL
Qty: 30 | Refills: 1 | OUTPATIENT
Start: 2017-08-17 | End: 2017-10-15

## 2017-08-17 RX ADMIN — ALBUTEROL 2.5 MILLIGRAM(S): 90 AEROSOL, METERED ORAL at 10:42

## 2017-08-17 RX ADMIN — Medication 40 MILLIGRAM(S): at 05:54

## 2017-08-17 RX ADMIN — Medication 3 MILLILITER(S): at 03:30

## 2017-08-17 RX ADMIN — Medication 0.5 MILLIGRAM(S): at 10:08

## 2017-08-17 RX ADMIN — MONTELUKAST 10 MILLIGRAM(S): 4 TABLET, CHEWABLE ORAL at 12:38

## 2017-08-17 RX ADMIN — Medication 0.5 MILLIGRAM(S): at 21:49

## 2017-08-17 RX ADMIN — Medication 3 MILLILITER(S): at 21:35

## 2017-08-17 RX ADMIN — Medication 100 MILLIGRAM(S): at 01:42

## 2017-08-17 RX ADMIN — Medication 3 MILLILITER(S): at 10:42

## 2017-08-17 NOTE — PROGRESS NOTE ADULT - PROBLEM SELECTOR PLAN 1
Pt has hx of asthma diagnosed in 07-08 by pulmonologist Dr. Worrell; clinically improved but still wheezing; peak flow at 210  - c/w Duonebs, added LABA/ICS in nebulizer form + Singulair   - O2 NC as needed

## 2017-08-17 NOTE — PROGRESS NOTE ADULT - SUBJECTIVE AND OBJECTIVE BOX
Subjective:    Patient is a 66y old  Female who presents with a chief complaint of asthmatic wheezing (15 Aug 2017 08:33) - no overnight events, still wheezing, peak flow at 210-220 this morning; pt is trying to use other peak flow, which is easier, in order to get a better number so that she can leave    MEDICATIONS  (STANDING):  ALBUTerol/ipratropium for Nebulization 3 milliLiter(s) Nebulizer every 6 hours  predniSONE   Tablet 40 milliGRAM(s) Oral daily  enoxaparin Injectable 40 milliGRAM(s) SubCutaneous daily  montelukast 10 milliGRAM(s) Oral daily  buDESOnide   0.5 milliGRAM(s) Respule 0.5 milliGRAM(s) Inhalation two times a day    MEDICATIONS  (PRN):  benzonatate 100 milliGRAM(s) Oral three times a day PRN Cough  ALBUTerol    0.083% 2.5 milliGRAM(s) Nebulizer every 4 hours PRN Shortness of Breath and/or Wheezing    Objective:    Vitals: Vital Signs Last 24 Hrs  T(C): 36.4 (08-17-17 @ 05:57), Max: 36.8 (08-16-17 @ 22:26)  T(F): 97.6 (08-17-17 @ 05:57), Max: 98.3 (08-16-17 @ 22:26)  HR: 61 (08-17-17 @ 05:57) (61 - 85)  BP: 136/76 (08-17-17 @ 05:57) (136/76 - 140/76)  BP(mean): --  RR: 18 (08-17-17 @ 05:57) (18 - 18)  SpO2: 96% (08-17-17 @ 05:57) (93% - 99%)            I&O's Summary      PHYSICAL EXAM:  GENERAL: NAD, well-groomed, well-developed  CHEST/LUNG: wheezes bilaterally, more prominent on L than R  HEART: Regular rate and rhythm; No murmurs, rubs, or gallops  ABDOMEN: Soft, Nontender, Nondistended; Bowel sounds present                                         LABS:  08-16    141  |  102  |  11  ----------------------------<  97  3.8   |  25  |  0.83  08-15    141  |  102  |  11  ----------------------------<  133<H>  4.1   |  23  |  0.90    Ca    9.2      16 Aug 2017 05:20  Ca    9.7      15 Aug 2017 06:15  Phos  4.1     08-16  Mg     2.1     08-16    TPro  7.4  /  Alb  4.3  /  TBili  0.3  /  DBili  x   /  AST  20  /  ALT  8   /  AlkPhos  119  08-15                                              13.7   6.29  )-----------( 229      ( 16 Aug 2017 05:20 )             42.7                         14.6   6.98  )-----------( 256      ( 15 Aug 2017 06:15 )             45.5     CAPILLARY BLOOD GLUCOSE          RADIOLOGY & ADDITIONAL TESTS:    Imaging Personally Reviewed:  [ ] YES  [ ] NO      Consultants involved in case:   Consultant(s) Notes Reviewed:  [ ] YES  [ ] NO:   Care Discussed with Consultants/Other Providers [ ] YES  [ ] NO

## 2017-11-15 ENCOUNTER — EMERGENCY (EMERGENCY)
Facility: HOSPITAL | Age: 67
LOS: 1 days | Discharge: HOME | End: 2017-11-15
Admitting: EMERGENCY MEDICINE
Payer: MEDICARE

## 2017-11-15 VITALS
RESPIRATION RATE: 18 BRPM | HEART RATE: 87 BPM | OXYGEN SATURATION: 96 % | SYSTOLIC BLOOD PRESSURE: 168 MMHG | TEMPERATURE: 98 F | DIASTOLIC BLOOD PRESSURE: 74 MMHG

## 2017-11-15 VITALS
HEART RATE: 94 BPM | TEMPERATURE: 98 F | OXYGEN SATURATION: 94 % | DIASTOLIC BLOOD PRESSURE: 95 MMHG | SYSTOLIC BLOOD PRESSURE: 141 MMHG | RESPIRATION RATE: 16 BRPM

## 2017-11-15 LAB
ALBUMIN SERPL ELPH-MCNC: 4.5 G/DL — SIGNIFICANT CHANGE UP (ref 3.3–5)
ALP SERPL-CCNC: 135 U/L — HIGH (ref 40–120)
ALT FLD-CCNC: 11 U/L — SIGNIFICANT CHANGE UP (ref 4–33)
AST SERPL-CCNC: 25 U/L — SIGNIFICANT CHANGE UP (ref 4–32)
BASOPHILS # BLD AUTO: 0.03 K/UL — SIGNIFICANT CHANGE UP (ref 0–0.2)
BASOPHILS NFR BLD AUTO: 0.4 % — SIGNIFICANT CHANGE UP (ref 0–2)
BILIRUB SERPL-MCNC: 0.4 MG/DL — SIGNIFICANT CHANGE UP (ref 0.2–1.2)
BUN SERPL-MCNC: 10 MG/DL — SIGNIFICANT CHANGE UP (ref 7–23)
CALCIUM SERPL-MCNC: 9.7 MG/DL — SIGNIFICANT CHANGE UP (ref 8.4–10.5)
CHLORIDE SERPL-SCNC: 104 MMOL/L — SIGNIFICANT CHANGE UP (ref 98–107)
CO2 SERPL-SCNC: 23 MMOL/L — SIGNIFICANT CHANGE UP (ref 22–31)
COHGB MFR BLDV: 0.9 % — SIGNIFICANT CHANGE UP (ref 0.5–1.5)
COHGB MFR BLDV: 16.1 G/DL — HIGH (ref 11.5–15.5)
CREAT SERPL-MCNC: 0.91 MG/DL — SIGNIFICANT CHANGE UP (ref 0.5–1.3)
EOSINOPHIL # BLD AUTO: 0.12 K/UL — SIGNIFICANT CHANGE UP (ref 0–0.5)
EOSINOPHIL NFR BLD AUTO: 1.8 % — SIGNIFICANT CHANGE UP (ref 0–6)
GLUCOSE SERPL-MCNC: 93 MG/DL — SIGNIFICANT CHANGE UP (ref 70–99)
HCT VFR BLD CALC: 47.2 % — HIGH (ref 34.5–45)
HGB BLD-MCNC: 15.4 G/DL — SIGNIFICANT CHANGE UP (ref 11.5–15.5)
IMM GRANULOCYTES # BLD AUTO: 0.02 # — SIGNIFICANT CHANGE UP
IMM GRANULOCYTES NFR BLD AUTO: 0.3 % — SIGNIFICANT CHANGE UP (ref 0–1.5)
LYMPHOCYTES # BLD AUTO: 1.79 K/UL — SIGNIFICANT CHANGE UP (ref 1–3.3)
LYMPHOCYTES # BLD AUTO: 26.8 % — SIGNIFICANT CHANGE UP (ref 13–44)
MCHC RBC-ENTMCNC: 28.5 PG — SIGNIFICANT CHANGE UP (ref 27–34)
MCHC RBC-ENTMCNC: 32.6 % — SIGNIFICANT CHANGE UP (ref 32–36)
MCV RBC AUTO: 87.2 FL — SIGNIFICANT CHANGE UP (ref 80–100)
METHGB MFR BLDV: 1.1 % — SIGNIFICANT CHANGE UP (ref 0–1.5)
MONOCYTES # BLD AUTO: 0.44 K/UL — SIGNIFICANT CHANGE UP (ref 0–0.9)
MONOCYTES NFR BLD AUTO: 6.6 % — SIGNIFICANT CHANGE UP (ref 2–14)
NEUTROPHILS # BLD AUTO: 4.28 K/UL — SIGNIFICANT CHANGE UP (ref 1.8–7.4)
NEUTROPHILS NFR BLD AUTO: 64.1 % — SIGNIFICANT CHANGE UP (ref 43–77)
NRBC # FLD: 0 — SIGNIFICANT CHANGE UP
OXYHGB MFR BLDV: 51.9 % — LOW (ref 59–84)
PLATELET # BLD AUTO: 242 K/UL — SIGNIFICANT CHANGE UP (ref 150–400)
PMV BLD: 10.3 FL — SIGNIFICANT CHANGE UP (ref 7–13)
POTASSIUM SERPL-MCNC: 4.2 MMOL/L — SIGNIFICANT CHANGE UP (ref 3.5–5.3)
POTASSIUM SERPL-SCNC: 4.2 MMOL/L — SIGNIFICANT CHANGE UP (ref 3.5–5.3)
PROT SERPL-MCNC: 7.9 G/DL — SIGNIFICANT CHANGE UP (ref 6–8.3)
RBC # BLD: 5.41 M/UL — HIGH (ref 3.8–5.2)
RBC # FLD: 12.8 % — SIGNIFICANT CHANGE UP (ref 10.3–14.5)
SODIUM SERPL-SCNC: 145 MMOL/L — SIGNIFICANT CHANGE UP (ref 135–145)
WBC # BLD: 6.68 K/UL — SIGNIFICANT CHANGE UP (ref 3.8–10.5)
WBC # FLD AUTO: 6.68 K/UL — SIGNIFICANT CHANGE UP (ref 3.8–10.5)

## 2017-11-15 PROCEDURE — 71020: CPT | Mod: 26

## 2017-11-15 PROCEDURE — 99284 EMERGENCY DEPT VISIT MOD MDM: CPT | Mod: GC

## 2017-11-15 RX ORDER — IPRATROPIUM/ALBUTEROL SULFATE 18-103MCG
3 AEROSOL WITH ADAPTER (GRAM) INHALATION ONCE
Qty: 0 | Refills: 0 | Status: COMPLETED | OUTPATIENT
Start: 2017-11-15 | End: 2017-11-15

## 2017-11-15 RX ORDER — ALBUTEROL 90 UG/1
3 AEROSOL, METERED ORAL
Qty: 1 | Refills: 0 | OUTPATIENT
Start: 2017-11-15

## 2017-11-15 RX ADMIN — Medication 3 MILLILITER(S): at 20:04

## 2017-11-15 RX ADMIN — Medication 3 MILLILITER(S): at 19:45

## 2017-11-15 RX ADMIN — Medication 3 MILLILITER(S): at 20:46
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
## 2017-11-15 NOTE — ED PROVIDER NOTE - PROGRESS NOTE DETAILS
PHOENIX ALTMAN:  Pt endorses significant improvement in symptoms.  End expiratory wheeze on auscultation.  No accessory muscle usage.  Pt able to speak in long complete sentences without difficulty.  Peak flow at this time is 250 LPM.  Ambulated pt around Intake region for ~ 1 minute.  Pt tolerated exertion well.  Pt requesting to go home.  Strict return precautions given.  Will give prescription for nebulizer as pt requesting one.  Pt to follow up with PMD and pulmonary (referral list provided).
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
## 2017-11-15 NOTE — ED PROVIDER NOTE - MEDICAL DECISION MAKING DETAILS
Pt is a 66 y/o F former smoker PMHx asthma (no intubations; last admitted for exacerbation 8/17), HTN, HLD, h/o BRCA+, h/o bilateral mastectomy (2015) p/w asthma exacerbation x 3 days -- asthma exacerbation -- labs, cxr, albuterol

## 2017-11-15 NOTE — ED ADULT NURSE NOTE - OBJECTIVE STATEMENT
Bilateral wheezing noted.  Seen by PHOENIX Flowers.  Labs sent.  Completed 2 duoneb treatment.  X-ray done, wnl.  Pt feels much better, ambulating without sob.  Wheezing improved.   Pending dispo.

## 2017-11-15 NOTE — ED PROVIDER NOTE - PLAN OF CARE
Rest, drink plenty of fluids.  Advance activity as tolerated.  Continue all previously prescribed medications as directed.  Use Albuterol Inhaler 2 puffs every 4 to 6 hours as needed for shortness of breath and/or wheezing.  Complete course of prednisone as already prescribed to you.  Follow up with your primary care physician and pulmonary (referral list provided) in 48-72 hours- bring copies of your results.  Return to the ER for worsening or persistent symptoms, including but not limited to shortness of breath, wheezing, chest pain, passing out, fevers and/or ANY NEW OR CONCERNING SYMPTOMS. If you have issues obtaining follow up, please call: [phone] to obtain a doctor or specialist who takes your insurance in your area.

## 2017-11-15 NOTE — ED PROVIDER NOTE - NONTENDER LOCATION
right costovertebral angle/left upper quadrant/left lower quadrant/umbilical/suprapubic/left costovertebral angle/right lower quadrant/right upper quadrant/periumbilical

## 2017-11-15 NOTE — ED ADULT NURSE NOTE - CHIEF COMPLAINT
The patient is a 67y Female complaining of wheezing x 3 days, not improving after oral steroids, and proair, dulrera use.

## 2017-11-15 NOTE — ED PROVIDER NOTE - CARE PLAN
Principal Discharge DX:	Asthma exacerbation  Instructions for follow-up, activity and diet:	Rest, drink plenty of fluids.  Advance activity as tolerated.  Continue all previously prescribed medications as directed.  Use Albuterol Inhaler 2 puffs every 4 to 6 hours as needed for shortness of breath and/or wheezing.  Complete course of prednisone as already prescribed to you.  Follow up with your primary care physician and pulmonary (referral list provided) in 48-72 hours- bring copies of your results.  Return to the ER for worsening or persistent symptoms, including but not limited to shortness of breath, wheezing, chest pain, passing out, fevers and/or ANY NEW OR CONCERNING SYMPTOMS. If you have issues obtaining follow up, please call: [phone] to obtain a doctor or specialist who takes your insurance in your area.

## 2018-01-15 ENCOUNTER — EMERGENCY (EMERGENCY)
Facility: HOSPITAL | Age: 68
LOS: 1 days | Discharge: HOME | End: 2018-01-15
Attending: EMERGENCY MEDICINE | Admitting: EMERGENCY MEDICINE
Payer: MEDICARE

## 2018-01-15 VITALS
HEART RATE: 95 BPM | SYSTOLIC BLOOD PRESSURE: 155 MMHG | OXYGEN SATURATION: 92 % | TEMPERATURE: 100 F | DIASTOLIC BLOOD PRESSURE: 91 MMHG | RESPIRATION RATE: 24 BRPM

## 2018-01-15 PROCEDURE — 99285 EMERGENCY DEPT VISIT HI MDM: CPT | Mod: 25,GC

## 2018-01-15 RX ORDER — IPRATROPIUM/ALBUTEROL SULFATE 18-103MCG
3 AEROSOL WITH ADAPTER (GRAM) INHALATION ONCE
Qty: 0 | Refills: 0 | Status: COMPLETED | OUTPATIENT
Start: 2018-01-15 | End: 2018-01-15

## 2018-01-15 RX ADMIN — Medication 3 MILLILITER(S): at 23:05

## 2018-01-15 RX ADMIN — Medication 3 MILLILITER(S): at 23:04

## 2018-01-15 NOTE — ED ADULT TRIAGE NOTE - CHIEF COMPLAINT QUOTE
c/o worsening sob for past few weeks. coughing up white phlegm. + wheezing bilaterally. denies any chest pain.

## 2018-01-16 VITALS
OXYGEN SATURATION: 93 % | RESPIRATION RATE: 18 BRPM | DIASTOLIC BLOOD PRESSURE: 31 MMHG | SYSTOLIC BLOOD PRESSURE: 136 MMHG | HEART RATE: 74 BPM | TEMPERATURE: 98 F

## 2018-01-16 PROCEDURE — 71046 X-RAY EXAM CHEST 2 VIEWS: CPT | Mod: 26

## 2018-01-16 RX ORDER — IPRATROPIUM/ALBUTEROL SULFATE 18-103MCG
3 AEROSOL WITH ADAPTER (GRAM) INHALATION ONCE
Qty: 0 | Refills: 0 | Status: COMPLETED | OUTPATIENT
Start: 2018-01-16 | End: 2018-01-16

## 2018-01-16 RX ORDER — ALBUTEROL 90 UG/1
3 AEROSOL, METERED ORAL
Qty: 1 | Refills: 0 | OUTPATIENT
Start: 2018-01-16

## 2018-01-16 RX ORDER — IBUPROFEN 200 MG
400 TABLET ORAL ONCE
Qty: 0 | Refills: 0 | Status: COMPLETED | OUTPATIENT
Start: 2018-01-16 | End: 2018-01-16

## 2018-01-16 RX ADMIN — Medication 3 MILLILITER(S): at 01:06

## 2018-01-16 RX ADMIN — Medication 400 MILLIGRAM(S): at 03:37

## 2018-01-16 RX ADMIN — Medication 50 MILLIGRAM(S): at 03:36

## 2018-01-16 RX ADMIN — Medication 400 MILLIGRAM(S): at 02:45

## 2018-01-16 NOTE — ED PROVIDER NOTE - NS_EDPROVIDERDISPOUSERTYPE_ED_A_ED
History of Present Illness  TCM Communication  Luke: The patient is being contacted for follow-up after hospitalization and Spoke to PT on   She was hospitalized at [facility]  The date of admission: , date of discharge:   Diagnosis: Dog bite of right hand including fingers with infection  She was discharged to home  Medications reviewed and updated today  She did not schedule a follow up appointment  Follow-up appointments with other specialists: Dr Ruth Locke  Communication performed and completed by HAVEN Coates   HPI: According to PT - she doing OK - will follow up with Ruth Olsen next week  Has no VNA coming in  At this time - she doesn't want to make an appointment with Dr Fernie Duran - will call in the new year for a follow up appointmnet with our office  Active Problems    1  Anxiety (300 00) (F41 9)   2  Bilateral otitis media (382 9) (H66 93)   3  Elbow pain (719 42) (M25 529)   4  Encounter for screening for other disorder ( 89) (Z13 89)   5  Impingement syndrome of left shoulder (726 2) (M75 42)   6  Infection of right hand (686 9) (L08 9)   7  Injury of hand, right (959 4) (S69 91XA)   8  Left knee injury (959 7) (S89 92XA)   9  Mood disorder (296 90) (F39)   10  Multiple joint pain (719 49) (M25 50)   11  Obesity (278 00) (E66 9)   12  Open wound of ossicle of right ear with complication (103 67) (L38 234Y)   13  Pain in shoulder (719 41) (M25 519)   14  Pain, foot (729 5) (M79 673)   15  Pain, hand joint, right (719 44) (M79 641)   16  Palpitations (785 1) (R00 2)   17  Pre-operative cardiovascular examination (V72 81) (Z01 810)   18  Sinusitis (473 9) (J32 9)    Past Medical History    1  History of Generalized anxiety disorder (300 02) (F41 1)   2  History of  6   3  History of insomnia (V13 89) (Z87 898)   4  History of ischemic heart disease (V12 59) (Z86 79)   5  History of spontaneous  (V13 29) (Z87 59)   6   History of Knee joint pain (719 46) (M25 569)   7  History of Myalgia (729 1) (M79 1)   8  History of Screening for breast cancer (V76 10) (Z12 39)   9  History of Willing to be an organ donor (V70 8) (Z00 5)    Surgical History    1  History of Knee Surgery    Family History  Mother    1  Family history of Alzheimer Disease   2  Family history of Atrial Fibrillation  Father    3  Family history of Coronary Artery Disease (V17 49)   4  Family history of hypertension (V17 49) (Z82 49)  Daughter    5  Family history of osteogenesis imperfecta (V17 89) (Z82 69)    Social History    · Always uses seat belt   · Being A Social Drinker   · Caffeine use (V49 89) (F15 90)   · Care provider for parents (V61 49) (Z63 6)   · Denied: History of Current Smoker   ·    · Guns in the Home: Stored in locked cabinet   · No drug use   · Racial background    Current Meds   1  Augmentin 500-125 MG Oral Tablet; take one tablet bid ac until finished; Therapy: 03XHI8492 to (Evaluate:46Nzg5125) Recorded   2  Multivitamins TABS; TAKE 1 TABLET DAILY; Therapy: 20NNR6180 to (Evaluate:79Nzx4066) Recorded   3  Percocet 5-325 MG Oral Tablet; TAKE 1 TABLET EVERY 4 TO 6 HOURS AS NEEDED   FOR PAIN;   Therapy: 87LUK5489 to (Evaluate:35Rxp2529) Recorded   4  Phentermine HCl - 30 MG Oral Capsule; take one tablet by mouth every day; Therapy: 05PNR5484 to (Evaluate:29Ijn6709)  Requested for: 99LZF3825; Last   Rx:[id number] Ordered   5  Venlafaxine HCl ER 75 MG Oral Capsule Extended Release 24 Hour; TAKE 1 CAPSULE   DAILY; Therapy: 95HPF6107 to (Evaluate:53Ynt2179)  Requested for: 69Hnu2316; Last   Rx:[id number] Ordered    Allergies    1   No Known Drug Allergies    Signatures   Electronically signed by : Rich Benitez DO; Nov 25 2016 12:42PM EST                       (Author) Attending Attestation (For Attendings USE Only)...

## 2018-01-16 NOTE — ED PROVIDER NOTE - MEDICAL DECISION MAKING DETAILS
67F, pmhx asthma, hld, htn, breast ca s/p BL mastectomy presents to ED with chief complaint of worsening cough and shortness of breath over last 3 days. Concern for asthma exacerbation. Will administer another duoneb, CXR. Currently stable, no acute distress. Will continue to follow up and re-assess. Case discussed with Attending: Ainsley Brock MD, PGY1

## 2018-01-16 NOTE — ED ADULT NURSE REASSESSMENT NOTE - NS ED NURSE REASSESS COMMENT FT1
Pt remains A&Ox3, denies chest pain, SOB, dizziness, lightheadedness, fever, chills, n/v/d at this time. Pt appears comfortably in stretcher, respirations even and unlabored, nad noted at this time. States she is feeling much better and is ready to go home. Awaiting discharge paperwork from MD. Safety and comfort maintained.
pt reports improvement in breathing after nebs, but still wheezing. O2 sat still 92%. placed on 2L NC.

## 2018-01-16 NOTE — ED PROVIDER NOTE - OBJECTIVE STATEMENT
67F, pmhx asthma, hld, htn, breast ca s/p BL mastectomy presents to ED with chief complaint of worsening cough and shortness of breath over last 3 days. No associated fevers or chills, nausea or vomiting, body aches, chest pain, dizziness, headache, blurry vision, abdominal pain, diarrhea or constipation, dysuria. States that this feels very similar to prior asthma exacerbations. Used albuterol pump multiple times today without relief. Patient received 2 duoneb in triage without significant relief.  Of note: Patient states that 2 weeks ago she saw her doctor Dr. Worrell who gave her a few day course of prednisone due to asthma exacerbation symptoms. Of note, was seen in ED in November 2017 with similar chief complaint.  Meds, allergies as below. Former smoker.

## 2018-01-16 NOTE — ED PROVIDER NOTE - CHIEF COMPLAINT
The patient is a 67y Female complaining of The patient is a 67y Female complaining of cough and shortness of breath

## 2018-01-16 NOTE — ED PROVIDER NOTE - ATTENDING CONTRIBUTION TO CARE
DR. MOON, ATTENDING MD-  I performed a face to face bedside interview with patient regarding history of present illness, review of symptoms and past medical history. I completed an independent physical exam.  I have discussed patient's plan of care with the resident.   Documentation as above in the note.   HPI: 68 yo F with history of HTN and asthma, previous smoker quit 10 years ago that presents with asthma exacerbation. Has had dry cough. Saw pulelias VILLEGAS 2 wks ago, given albuterol and prednisone, finished course, but since has had on/off cough and diff breathing, needing to catch breath. No fevers, chills, N/V/D, sick contact includes granddaughter that was just discharged from [facility] ED. No travel, no rash, no smoking. No chest pain, abd pain, weakness.   EXAM: Well appearing, NAD, lungs diffuse end expiratory wheezing throughout bilateral lungs, heart RRR.  No pitting edema in BLE.   MDM: Concern for asthma due to complaints, history, and wheezing on exam. Otherwise well appearing, Denies chest pain so unlikely ACS. May possibly be URI exacerbated. Already received 2 Duonebs, will provide one more and obtain and CXR and reassess. Pt recently taken steroids and would like to hold off if possible for now for another course.

## 2018-01-16 NOTE — ED ADULT NURSE NOTE - OBJECTIVE STATEMENT
received to room 10. complains of cough and sob for past 3 days. denies any fevers/chills, chest pain, dizziness, weakness. still wheezing after 2 nebs in triage. given 3rd duoneb. awaits xray in no acute distress with family at bedside.

## 2018-01-16 NOTE — ED PROVIDER NOTE - PROGRESS NOTE DETAILS
Patient resting in bed. Lungs with diffuse wheezing, no significant improvement. Will administer prednisone  Kameron Brock MD, PGY1 Patient feeling improved. Lungs with diffuse wheezing, mild improvement from prior. O2 Sat 92-95% off O2, per prior hx, similar to past vitals. Will d/c with return precuations, instructions to f/u with PCP, and 4 more days prednisone 20mg  Kameron Brock MD, PGY1

## 2018-02-07 NOTE — ED ADULT TRIAGE NOTE - NS ED TRIAGE AVPU SCALE
Alert-The patient is alert, awake and responds to voice. The patient is oriented to time, place, and person. The triage nurse is able to obtain subjective information. No

## 2018-03-23 ENCOUNTER — INPATIENT (INPATIENT)
Facility: HOSPITAL | Age: 68
LOS: 2 days | Discharge: HOME | End: 2018-03-26
Attending: HOSPITALIST | Admitting: HOSPITALIST
Payer: MEDICARE

## 2018-03-23 VITALS
HEART RATE: 102 BPM | SYSTOLIC BLOOD PRESSURE: 166 MMHG | DIASTOLIC BLOOD PRESSURE: 90 MMHG | RESPIRATION RATE: 24 BRPM | TEMPERATURE: 98 F | OXYGEN SATURATION: 92 %

## 2018-03-23 RX ORDER — IPRATROPIUM/ALBUTEROL SULFATE 18-103MCG
3 AEROSOL WITH ADAPTER (GRAM) INHALATION ONCE
Qty: 0 | Refills: 0 | Status: COMPLETED | OUTPATIENT
Start: 2018-03-23 | End: 2018-03-23

## 2018-03-23 RX ADMIN — Medication 3 MILLILITER(S): at 23:58

## 2018-03-23 RX ADMIN — Medication 60 MILLIGRAM(S): at 23:44

## 2018-03-23 RX ADMIN — Medication 3 MILLILITER(S): at 23:40

## 2018-03-23 RX ADMIN — Medication 3 MILLILITER(S): at 23:20

## 2018-03-23 NOTE — ED PROVIDER NOTE - MEDICAL DECISION MAKING DETAILS
66yo female with pmh of asthma on dulera, hld, htn, breast ca s/p BL mastectomy presents for worsening cough and shortness of breath over last 3 days - r/o pna vs asthma exacerbation

## 2018-03-23 NOTE — ED ADULT NURSE NOTE - OBJECTIVE STATEMENT
Pt AO x3, ambulatory, c/o severe asthma exacerbation, difficulty breathing for 3 days.  pt also has productive cough with left sided CP. Taking Inhaler pump w/o relief. Evaluated by provider. Due medication given as ordered. Cardiac monitor in place. Will continue to monitor. Awaiting further study.

## 2018-03-23 NOTE — ED ADULT TRIAGE NOTE - CHIEF COMPLAINT QUOTE
Pt c/o severe asthma exacerbation, difficulty breathing, Left sided CP for a few days.  Taking Inhaler pump w/o relief.   Pt tachypneic, increased WOB, wheezing auscultated bilat, o2sat 92% RA. Dr. Bloch called to evaluate pt, Duoneb initiated in triage, EKG in progress.  PMHx Breast CA bilat mastectomy per pt no limb restriction, Asthma. Charge RN Notified to pt status.

## 2018-03-23 NOTE — ED PROVIDER NOTE - ATTENDING CONTRIBUTION TO CARE
ABDIAS Attending Note - Dr. Kaur  68yo female with pmh of asthma on dulera, hld, htn, breast ca s/p BL mastectomy presents for worsening cough and shortness of breath and chest tightness over last 3 days  PE: pt is alert and oriented, perrl, ent normal, membranes are moist, neck supple. no lymphadenopathy or thyroid enlargement, No JVD.  Chestbilateral poor air movement with expiratory wheezing and prolongation of expiratory phase,, Heart- reg rhythm without murmur, rubs or gallops, radial pulses equal bilaterally.  Abd is soft, non-tender, Bowel sounds are active. no mass or organomegaly. : No CVA tenderness. Neuro:  Pt alert and oriented x 3. Perrl    Distal neurosensory is intact. Motor function is 5/5 strength bilaterally.  No focal deficits. Extremities:  No edema.  Skin: warm and dry.  Plan:  Labs, including CBC, CMP, EKG, CXR, Albuterol plus Ipratropium, Prednisone     Impression:  Asthma

## 2018-03-23 NOTE — ED PROVIDER NOTE - OBJECTIVE STATEMENT
66yo female with pmh of asthma on dulera, hld, htn, breast ca s/p BL mastectomy presents for worsening cough and shortness of breath over last 3 days. Pt also with chest tightness similar to her previous exacerbations. Pt denies fevers or chills, nausea or vomiting, body aches, dizziness, headache, blurry vision, abdominal pain, diarrhea or constipation, dysuria. States that this feels very similar to prior asthma exacerbations. PT has used albuterol pump multiple times every 4 hours without relief.

## 2018-03-24 LAB
ALBUMIN SERPL ELPH-MCNC: 4 G/DL — SIGNIFICANT CHANGE UP (ref 3.3–5)
ALP SERPL-CCNC: 96 U/L — SIGNIFICANT CHANGE UP (ref 40–120)
ALT FLD-CCNC: 17 U/L — SIGNIFICANT CHANGE UP (ref 4–33)
AST SERPL-CCNC: 39 U/L — HIGH (ref 4–32)
B PERT DNA SPEC QL NAA+PROBE: SIGNIFICANT CHANGE UP
BASOPHILS # BLD AUTO: 0.04 K/UL — SIGNIFICANT CHANGE UP (ref 0–0.2)
BASOPHILS NFR BLD AUTO: 0.4 % — SIGNIFICANT CHANGE UP (ref 0–2)
BILIRUB SERPL-MCNC: 0.3 MG/DL — SIGNIFICANT CHANGE UP (ref 0.2–1.2)
BUN SERPL-MCNC: 9 MG/DL — SIGNIFICANT CHANGE UP (ref 7–23)
C PNEUM DNA SPEC QL NAA+PROBE: NOT DETECTED — SIGNIFICANT CHANGE UP
CALCIUM SERPL-MCNC: 8.5 MG/DL — SIGNIFICANT CHANGE UP (ref 8.4–10.5)
CHLORIDE SERPL-SCNC: 103 MMOL/L — SIGNIFICANT CHANGE UP (ref 98–107)
CO2 SERPL-SCNC: 22 MMOL/L — SIGNIFICANT CHANGE UP (ref 22–31)
CREAT SERPL-MCNC: 0.74 MG/DL — SIGNIFICANT CHANGE UP (ref 0.5–1.3)
EOSINOPHIL # BLD AUTO: 0.56 K/UL — HIGH (ref 0–0.5)
EOSINOPHIL NFR BLD AUTO: 5.5 % — SIGNIFICANT CHANGE UP (ref 0–6)
FLUAV H1 2009 PAND RNA SPEC QL NAA+PROBE: NOT DETECTED — SIGNIFICANT CHANGE UP
FLUAV H1 RNA SPEC QL NAA+PROBE: NOT DETECTED — SIGNIFICANT CHANGE UP
FLUAV H3 RNA SPEC QL NAA+PROBE: NOT DETECTED — SIGNIFICANT CHANGE UP
FLUAV SUBTYP SPEC NAA+PROBE: SIGNIFICANT CHANGE UP
FLUBV RNA SPEC QL NAA+PROBE: NOT DETECTED — SIGNIFICANT CHANGE UP
GLUCOSE SERPL-MCNC: 125 MG/DL — HIGH (ref 70–99)
HADV DNA SPEC QL NAA+PROBE: NOT DETECTED — SIGNIFICANT CHANGE UP
HCOV 229E RNA SPEC QL NAA+PROBE: NOT DETECTED — SIGNIFICANT CHANGE UP
HCOV HKU1 RNA SPEC QL NAA+PROBE: NOT DETECTED — SIGNIFICANT CHANGE UP
HCOV NL63 RNA SPEC QL NAA+PROBE: NOT DETECTED — SIGNIFICANT CHANGE UP
HCOV OC43 RNA SPEC QL NAA+PROBE: NOT DETECTED — SIGNIFICANT CHANGE UP
HCT VFR BLD CALC: 44.7 % — SIGNIFICANT CHANGE UP (ref 34.5–45)
HGB BLD-MCNC: 14.7 G/DL — SIGNIFICANT CHANGE UP (ref 11.5–15.5)
HMPV RNA SPEC QL NAA+PROBE: NOT DETECTED — SIGNIFICANT CHANGE UP
HPIV1 RNA SPEC QL NAA+PROBE: NOT DETECTED — SIGNIFICANT CHANGE UP
HPIV2 RNA SPEC QL NAA+PROBE: NOT DETECTED — SIGNIFICANT CHANGE UP
HPIV3 RNA SPEC QL NAA+PROBE: NOT DETECTED — SIGNIFICANT CHANGE UP
HPIV4 RNA SPEC QL NAA+PROBE: NOT DETECTED — SIGNIFICANT CHANGE UP
IMM GRANULOCYTES # BLD AUTO: 0.04 # — SIGNIFICANT CHANGE UP
IMM GRANULOCYTES NFR BLD AUTO: 0.4 % — SIGNIFICANT CHANGE UP (ref 0–1.5)
LYMPHOCYTES # BLD AUTO: 1.18 K/UL — SIGNIFICANT CHANGE UP (ref 1–3.3)
LYMPHOCYTES # BLD AUTO: 11.5 % — LOW (ref 13–44)
M PNEUMO DNA SPEC QL NAA+PROBE: NOT DETECTED — SIGNIFICANT CHANGE UP
MCHC RBC-ENTMCNC: 28.8 PG — SIGNIFICANT CHANGE UP (ref 27–34)
MCHC RBC-ENTMCNC: 32.9 % — SIGNIFICANT CHANGE UP (ref 32–36)
MCV RBC AUTO: 87.6 FL — SIGNIFICANT CHANGE UP (ref 80–100)
MONOCYTES # BLD AUTO: 0.33 K/UL — SIGNIFICANT CHANGE UP (ref 0–0.9)
MONOCYTES NFR BLD AUTO: 3.2 % — SIGNIFICANT CHANGE UP (ref 2–14)
NEUTROPHILS # BLD AUTO: 8.11 K/UL — HIGH (ref 1.8–7.4)
NEUTROPHILS NFR BLD AUTO: 79 % — HIGH (ref 43–77)
NRBC # FLD: 0 — SIGNIFICANT CHANGE UP
PLATELET # BLD AUTO: 310 K/UL — SIGNIFICANT CHANGE UP (ref 150–400)
PMV BLD: 10.5 FL — SIGNIFICANT CHANGE UP (ref 7–13)
POTASSIUM SERPL-MCNC: SIGNIFICANT CHANGE UP MMOL/L (ref 3.5–5.3)
POTASSIUM SERPL-SCNC: SIGNIFICANT CHANGE UP MMOL/L (ref 3.5–5.3)
PROT SERPL-MCNC: 7.7 G/DL — SIGNIFICANT CHANGE UP (ref 6–8.3)
RBC # BLD: 5.1 M/UL — SIGNIFICANT CHANGE UP (ref 3.8–5.2)
RBC # FLD: 13 % — SIGNIFICANT CHANGE UP (ref 10.3–14.5)
RSV RNA SPEC QL NAA+PROBE: NOT DETECTED — SIGNIFICANT CHANGE UP
RV+EV RNA SPEC QL NAA+PROBE: NOT DETECTED — SIGNIFICANT CHANGE UP
SODIUM SERPL-SCNC: 141 MMOL/L — SIGNIFICANT CHANGE UP (ref 135–145)
WBC # BLD: 10.26 K/UL — SIGNIFICANT CHANGE UP (ref 3.8–10.5)
WBC # FLD AUTO: 10.26 K/UL — SIGNIFICANT CHANGE UP (ref 3.8–10.5)

## 2018-03-24 PROCEDURE — 71046 X-RAY EXAM CHEST 2 VIEWS: CPT | Mod: 26

## 2018-03-24 RX ORDER — KETOROLAC TROMETHAMINE 30 MG/ML
15 SYRINGE (ML) INJECTION ONCE
Qty: 0 | Refills: 0 | Status: DISCONTINUED | OUTPATIENT
Start: 2018-03-24 | End: 2018-03-24

## 2018-03-24 RX ORDER — IPRATROPIUM/ALBUTEROL SULFATE 18-103MCG
3 AEROSOL WITH ADAPTER (GRAM) INHALATION ONCE
Qty: 0 | Refills: 0 | Status: COMPLETED | OUTPATIENT
Start: 2018-03-24 | End: 2018-03-24

## 2018-03-24 RX ORDER — MAGNESIUM SULFATE 500 MG/ML
2 VIAL (ML) INJECTION ONCE
Qty: 0 | Refills: 0 | Status: COMPLETED | OUTPATIENT
Start: 2018-03-24 | End: 2018-03-24

## 2018-03-24 RX ORDER — MAGNESIUM SULFATE 500 MG/ML
2 VIAL (ML) INJECTION ONCE
Qty: 0 | Refills: 0 | Status: DISCONTINUED | OUTPATIENT
Start: 2018-03-24 | End: 2018-03-24

## 2018-03-24 RX ORDER — ALBUTEROL 90 UG/1
2.5 AEROSOL, METERED ORAL EVERY 4 HOURS
Qty: 0 | Refills: 0 | Status: DISCONTINUED | OUTPATIENT
Start: 2018-03-24 | End: 2018-03-25

## 2018-03-24 RX ORDER — IPRATROPIUM/ALBUTEROL SULFATE 18-103MCG
3 AEROSOL WITH ADAPTER (GRAM) INHALATION EVERY 6 HOURS
Qty: 0 | Refills: 0 | Status: DISCONTINUED | OUTPATIENT
Start: 2018-03-24 | End: 2018-03-24

## 2018-03-24 RX ORDER — BUDESONIDE AND FORMOTEROL FUMARATE DIHYDRATE 160; 4.5 UG/1; UG/1
2 AEROSOL RESPIRATORY (INHALATION)
Qty: 0 | Refills: 0 | Status: DISCONTINUED | OUTPATIENT
Start: 2018-03-24 | End: 2018-03-26

## 2018-03-24 RX ORDER — AMLODIPINE BESYLATE 2.5 MG/1
5 TABLET ORAL DAILY
Qty: 0 | Refills: 0 | Status: DISCONTINUED | OUTPATIENT
Start: 2018-03-24 | End: 2018-03-26

## 2018-03-24 RX ORDER — DIPHENHYDRAMINE HCL 50 MG
25 CAPSULE ORAL ONCE
Qty: 0 | Refills: 0 | Status: COMPLETED | OUTPATIENT
Start: 2018-03-24 | End: 2018-03-24

## 2018-03-24 RX ORDER — ALBUTEROL 90 UG/1
2.5 AEROSOL, METERED ORAL EVERY 4 HOURS
Qty: 0 | Refills: 0 | Status: DISCONTINUED | OUTPATIENT
Start: 2018-03-24 | End: 2018-03-24

## 2018-03-24 RX ORDER — SODIUM CHLORIDE 9 MG/ML
500 INJECTION INTRAMUSCULAR; INTRAVENOUS; SUBCUTANEOUS ONCE
Qty: 0 | Refills: 0 | Status: COMPLETED | OUTPATIENT
Start: 2018-03-24 | End: 2018-03-24

## 2018-03-24 RX ORDER — ALBUTEROL 90 UG/1
2 AEROSOL, METERED ORAL
Qty: 8.5 | Refills: 0 | OUTPATIENT
Start: 2018-03-24 | End: 2018-03-26

## 2018-03-24 RX ORDER — ALBUTEROL 90 UG/1
3 AEROSOL, METERED ORAL
Qty: 16 | Refills: 0 | OUTPATIENT
Start: 2018-03-24

## 2018-03-24 RX ADMIN — BUDESONIDE AND FORMOTEROL FUMARATE DIHYDRATE 2 PUFF(S): 160; 4.5 AEROSOL RESPIRATORY (INHALATION) at 10:51

## 2018-03-24 RX ADMIN — SODIUM CHLORIDE 500 MILLILITER(S): 9 INJECTION INTRAMUSCULAR; INTRAVENOUS; SUBCUTANEOUS at 11:30

## 2018-03-24 RX ADMIN — AMLODIPINE BESYLATE 5 MILLIGRAM(S): 2.5 TABLET ORAL at 06:12

## 2018-03-24 RX ADMIN — Medication 25 MILLIGRAM(S): at 16:23

## 2018-03-24 RX ADMIN — ALBUTEROL 2.5 MILLIGRAM(S): 90 AEROSOL, METERED ORAL at 13:40

## 2018-03-24 RX ADMIN — ALBUTEROL 2.5 MILLIGRAM(S): 90 AEROSOL, METERED ORAL at 17:39

## 2018-03-24 RX ADMIN — Medication 15 MILLIGRAM(S): at 08:59

## 2018-03-24 RX ADMIN — Medication 3 MILLILITER(S): at 05:54

## 2018-03-24 RX ADMIN — Medication 50 GRAM(S): at 02:11

## 2018-03-24 RX ADMIN — ALBUTEROL 2.5 MILLIGRAM(S): 90 AEROSOL, METERED ORAL at 21:23

## 2018-03-24 RX ADMIN — Medication 50 MILLIGRAM(S): at 13:38

## 2018-03-24 RX ADMIN — Medication 15 MILLIGRAM(S): at 09:15

## 2018-03-24 RX ADMIN — BUDESONIDE AND FORMOTEROL FUMARATE DIHYDRATE 2 PUFF(S): 160; 4.5 AEROSOL RESPIRATORY (INHALATION) at 21:23

## 2018-03-24 RX ADMIN — ALBUTEROL 2.5 MILLIGRAM(S): 90 AEROSOL, METERED ORAL at 09:41

## 2018-03-24 NOTE — ED ADULT NURSE REASSESSMENT NOTE - NS ED NURSE REASSESS COMMENT FT1
rpt given to CDU RN. pt's respirations are equal and unlabored, in NAD. IV intact. Pt transported to CDU at this time

## 2018-03-24 NOTE — ED CDU PROVIDER INITIAL DAY NOTE - ATTENDING CONTRIBUTION TO CARE
Dr. Mcmahon:  I performed a face to face bedside interview with patient regarding history of present illness, review of symptoms and past medical history. I completed an independent physical exam.  I have discussed patient's plan of care with PA.   I agree with note as stated above, having amended the EMR as needed to reflect my findings.   This includes HISTORY OF PRESENT ILLNESS, HIV, PAST MEDICAL/SURGICAL/FAMILY/SOCIAL HISTORY, ALLERGIES AND HOME MEDICATIONS, REVIEW OF SYSTEMS, PHYSICAL EXAM, and any PROGRESS NOTES during the time I functioned as the attending physician for this patient.    67F h/o asthma presents with SOB and chest tightness, similar to previous exacerbations    Exam:  - nad  - wheezing    A/P  - CDU for further nebs, steroids, reassess

## 2018-03-24 NOTE — ED CDU PROVIDER INITIAL DAY NOTE - OBJECTIVE STATEMENT
68yo female with pmh of asthma on dulera, hld, htn, breast ca s/p BL mastectomy presents for worsening cough and shortness of breath over last 3 days. Pt also with chest tightness similar to her previous exacerbations. Pt denies fevers or chills, nausea or vomiting, body aches, dizziness, headache, blurry vision, abdominal pain, diarrhea or constipation, dysuria. States that this feels very similar to prior asthma exacerbations. PT has used albuterol pump multiple times every 4 hours without relief.    CDU PA Baseil: 67 year old female, PMHx of asthma (never intubated), HTN, presents to the ED for asthma exacerbation. Patient states she has been compliant with her dulera and has been using her albuterol q 4 without relief. For the last three days she has had worsening shortness of breath, wheezing and chest tightness consistent with previous exacerbations. Due to the unrelieved wheezing and SOB with her inhaler she presented to the ED. Pt brought to CDU s/p duonebs, steroids, magnesium. On room air pt O2 sat mid-high 80s. On NC 3 L Sat mid 90s. 68yo female with pmh of asthma on dulera, hld, htn, breast ca s/p BL mastectomy presents for worsening cough and shortness of breath over last 3 days. Pt also with chest tightness similar to her previous exacerbations. Pt denies fevers or chills, nausea or vomiting, body aches, dizziness, headache, blurry vision, abdominal pain, diarrhea or constipation, dysuria. States that this feels very similar to prior asthma exacerbations. PT has used albuterol pump multiple times every 4 hours without relief. Also having pain with coughing.  In ED found to have likely asthma exacerbation with normal CXR. Sent ot CDU for further E&M.     CDU PA Baseil: 67 year old female, PMHx of asthma (never intubated), HTN, presents to the ED for asthma exacerbation. Patient states she has been compliant with her dulera and has been using her albuterol q 4 without relief. For the last three days she has had worsening shortness of breath, wheezing and chest tightness consistent with previous exacerbations. Due to the unrelieved wheezing and SOB with her inhaler she presented to the ED. Pt brought to CDU s/p duonebs, steroids, magnesium. On room air pt O2 sat mid-high 80s. On NC 3 L Sat mid 90s.

## 2018-03-24 NOTE — ED CDU PROVIDER INITIAL DAY NOTE - PROGRESS NOTE DETAILS
Pt symptomatically improved with duoneb. Still with mild wheezing although greatly improved. Will continue duonebs and steroids. CDU Att PN: Jesus Alberto  States she feels much better.  Still somewhat SOB and wheezing loudly, but subjectively feeling improved.  Will c/w bronchodilators and reassess.  I have personally performed a face to face evaluation of this patient including review of the history and focused physical exam.  I have discussed the case and reviewed the plan of care with the PA. CDU Att PN: Jesus Alberto  Improving, still with strong and persistent cough.  I had ordered CT to assess for occult PNA but pt declined stating she had many radiology studies while being treated for cancer, and does not want that again.  I explained risks, including missed PNA. Pt states "I don't have PNA, I know. I'm getting better."  Will observe closely. Consider ABx if febrile.  I have personally performed a face to face evaluation of this patient including review of the history and focused physical exam.  I have discussed the case and reviewed the plan of care with the PA. Pt resting comfortably in bed NAD pt states she feels significantly better; pt denies chest pain/shortness of breath.  Lungs mild wheeze noted at bases.  Pt ordered for nebs q 4.  Will continue to monitor. PHOENIX Najera: after using the restroom and walking back to her bed the patient became short of breath and wheezing, pt placed in stretcher O2 sat 93/94% on RA, pt placed on nasal cannular O2 95% pt states she feels better pt declined nebulizer states she feels better.  Case d/w Dr Snyder, will admit.  Pt ok with plan. PHOENIX Najera: pt accepted by PCU spoke with Dr Krishnamurthy; pt refusing admission pt states she feeling better; pt O2 improved to 97% on room air while sitting in bed; talking in full sentences.  Pt was seen and evaluated by Dr nSyder will continue to monitor in CDU. Attending Note (Lillian): asked to evaluate patient, had episode of hypoxia on room air after ambulation. decision to admit made.  patient refusing admission. d/w patient.  patient not hypoxic on room air when at rest, but after short ambulation, becomes hypoxic, went to 91% on room air.  tachypnea worsened.  patient refusing admission, states he is ok going home and dying.  states she is afraid of hospitals.  I strongly encouraged to be admitted to hospital, showed her her pusle ox how it dropped after ambulation and her heart rate became tachycardic. patient states she feels fine.  admission canceled due to patient refusing.  patient then requested to stay the night in the cdu since she did not want to wake her daughter.  offered to discuss new information with patient's daughter but patient declined.  full decisional capacity.  will cancel admission and continue to observe in cdu.  patient agreed to be admitted immediately if we at any point thought her condition became worse.

## 2018-03-24 NOTE — ED CDU PROVIDER INITIAL DAY NOTE - MEDICAL DECISION MAKING DETAILS
68 yo F PMHx of asthma, HTN, presents for asthma exacerbation- RVP negative, will continue steroids, clark

## 2018-03-25 DIAGNOSIS — I10 ESSENTIAL (PRIMARY) HYPERTENSION: ICD-10-CM

## 2018-03-25 DIAGNOSIS — Z29.9 ENCOUNTER FOR PROPHYLACTIC MEASURES, UNSPECIFIED: ICD-10-CM

## 2018-03-25 DIAGNOSIS — J45.909 UNSPECIFIED ASTHMA, UNCOMPLICATED: ICD-10-CM

## 2018-03-25 DIAGNOSIS — J45.901 UNSPECIFIED ASTHMA WITH (ACUTE) EXACERBATION: ICD-10-CM

## 2018-03-25 DIAGNOSIS — C50.919 MALIGNANT NEOPLASM OF UNSPECIFIED SITE OF UNSPECIFIED FEMALE BREAST: ICD-10-CM

## 2018-03-25 DIAGNOSIS — Z02.9 ENCOUNTER FOR ADMINISTRATIVE EXAMINATIONS, UNSPECIFIED: ICD-10-CM

## 2018-03-25 LAB
BUN SERPL-MCNC: 12 MG/DL — SIGNIFICANT CHANGE UP (ref 7–23)
CALCIUM SERPL-MCNC: 9.4 MG/DL — SIGNIFICANT CHANGE UP (ref 8.4–10.5)
CHLORIDE SERPL-SCNC: 104 MMOL/L — SIGNIFICANT CHANGE UP (ref 98–107)
CO2 SERPL-SCNC: 25 MMOL/L — SIGNIFICANT CHANGE UP (ref 22–31)
CREAT SERPL-MCNC: 0.76 MG/DL — SIGNIFICANT CHANGE UP (ref 0.5–1.3)
GLUCOSE SERPL-MCNC: 148 MG/DL — HIGH (ref 70–99)
POTASSIUM SERPL-MCNC: 4.7 MMOL/L — SIGNIFICANT CHANGE UP (ref 3.5–5.3)
POTASSIUM SERPL-SCNC: 4.7 MMOL/L — SIGNIFICANT CHANGE UP (ref 3.5–5.3)
SODIUM SERPL-SCNC: 143 MMOL/L — SIGNIFICANT CHANGE UP (ref 135–145)

## 2018-03-25 PROCEDURE — 99223 1ST HOSP IP/OBS HIGH 75: CPT | Mod: GC

## 2018-03-25 RX ORDER — MONTELUKAST 4 MG/1
1 TABLET, CHEWABLE ORAL
Qty: 0 | Refills: 0 | COMMUNITY
Start: 2018-03-25

## 2018-03-25 RX ORDER — MONTELUKAST 4 MG/1
10 TABLET, CHEWABLE ORAL DAILY
Qty: 0 | Refills: 0 | Status: DISCONTINUED | OUTPATIENT
Start: 2018-03-25 | End: 2018-03-26

## 2018-03-25 RX ORDER — IPRATROPIUM/ALBUTEROL SULFATE 18-103MCG
3 AEROSOL WITH ADAPTER (GRAM) INHALATION EVERY 6 HOURS
Qty: 0 | Refills: 0 | Status: DISCONTINUED | OUTPATIENT
Start: 2018-03-25 | End: 2018-03-26

## 2018-03-25 RX ADMIN — Medication 50 MILLIGRAM(S): at 05:24

## 2018-03-25 RX ADMIN — MONTELUKAST 10 MILLIGRAM(S): 4 TABLET, CHEWABLE ORAL at 17:42

## 2018-03-25 RX ADMIN — Medication 3 MILLILITER(S): at 21:25

## 2018-03-25 RX ADMIN — ALBUTEROL 2.5 MILLIGRAM(S): 90 AEROSOL, METERED ORAL at 13:35

## 2018-03-25 RX ADMIN — ALBUTEROL 2.5 MILLIGRAM(S): 90 AEROSOL, METERED ORAL at 02:27

## 2018-03-25 RX ADMIN — BUDESONIDE AND FORMOTEROL FUMARATE DIHYDRATE 2 PUFF(S): 160; 4.5 AEROSOL RESPIRATORY (INHALATION) at 09:00

## 2018-03-25 RX ADMIN — ALBUTEROL 2.5 MILLIGRAM(S): 90 AEROSOL, METERED ORAL at 08:44

## 2018-03-25 RX ADMIN — AMLODIPINE BESYLATE 5 MILLIGRAM(S): 2.5 TABLET ORAL at 05:24

## 2018-03-25 RX ADMIN — Medication 3 MILLILITER(S): at 16:36

## 2018-03-25 RX ADMIN — ALBUTEROL 2.5 MILLIGRAM(S): 90 AEROSOL, METERED ORAL at 05:24

## 2018-03-25 NOTE — ED CDU PROVIDER SUBSEQUENT DAY NOTE - PROGRESS NOTE DETAILS
Pt initially refused to be admitted, but after ambulating her O2 were in low 90s again. Discussed staying at the hospital for observation, pt finally admitted to stay. Pt still wheezing and sob while speaking. Will admit the pt to hospitalist since PCU doesn't have beds anymore.

## 2018-03-25 NOTE — H&P ADULT - NSHPREVIEWOFSYSTEMS_GEN_ALL_CORE
CONSTITUTIONAL: No fever, weight loss, or fatigue  EYES: No eye pain, visual disturbances, or discharge  ENMT:  No difficulty hearing, tinnitus, vertigo; No sinus or throat pain  RESPIRATORY: +cough, +wheezing, +SOB, +dyspnea on exertion +pleuritic chest pain  CARDIOVASCULAR: No chest pain, palpitations, dizziness, or leg swelling  GASTROINTESTINAL: No abdominal or epigastric pain. No nausea, vomiting, or hematemesis; No diarrhea or constipation. No melena or hematochezia.  GENITOURINARY: No dysuria, frequency, hematuria, or incontinence  NEUROLOGICAL: No headaches, loss of strength, numbness, or tremors  SKIN: +rash after Robitussin, now resolved   LYMPH NODES: No enlarged glands  ENDOCRINE: No heat or cold intolerance; No polydipsia or polyuria  MUSCULOSKELETAL: No joint pain or swelling;   PSYCHIATRIC: Denies depression, anxiety  HEME/LYMPH: No easy bruising, or bleeding gums  ALLERGY AND IMMUNOLOGIC: No hives or eczema

## 2018-03-25 NOTE — DISCHARGE NOTE ADULT - PLAN OF CARE
To treat your asthma You were admitted with an asthma exacerbation.  You were treated with nebulizer treatments and oral steroids. Please continue your steroids for 1 more day.  Please take your albuterol inhaler and nebulizer as needed and your Dulera twice a day.  Please follow up with Dr. Worrell within 1 week of discharge. Please continue your amlodipine as prescribed and follow up with your primary care doctor. You were admitted with an asthma exacerbation.  You were treated with nebulizer treatments and oral steroids. Please continue your steroids for 1 more day.  Please take your albuterol inhaler and nebulizer as needed and your Dulera twice a day.  Please follow up with Dr. Worrell within 1 week of discharge or if you prefer, follow up with Dr. Roberts at [facility] by calling [phone]. Management

## 2018-03-25 NOTE — ED CDU PROVIDER SUBSEQUENT DAY NOTE - HISTORY
PHOENIX Najera: pt sleeping comfortably in bed NAD, pt refused admission to hospital attempted to contact PCU fellow to advise with no call back.  Will continue to monitor.

## 2018-03-25 NOTE — DISCHARGE NOTE ADULT - CONDITIONS AT DISCHARGE
This Patient is stable, improved ; is independent with all of her care. She has no Cough but +, wheezing .  This Is improved also.  She continues to have Nebulizer Treatment every 6 hrs .  Her Appetite is great.  Instructions given

## 2018-03-25 NOTE — H&P ADULT - NSHPPHYSICALEXAM_GEN_ALL_CORE
Vital Signs Last 24 Hrs  T(C): 36.6 (25 Mar 2018 09:48), Max: 36.8 (24 Mar 2018 18:04)  T(F): 97.8 (25 Mar 2018 09:48), Max: 98.2 (24 Mar 2018 18:04)  HR: 87 (25 Mar 2018 10:35) (75 - 103)  BP: 173/89 (25 Mar 2018 09:48) (130/78 - 173/89)  BP(mean): --  RR: 20 (25 Mar 2018 10:35) (18 - 20)  SpO2: 94% (25 Mar 2018 10:35) (94% - 97%)    PHYSICAL EXAM:  GENERAL: NAD, well-groomed, well-developed  HEAD:  Atraumatic, Normocephalic  EYES: EOMI, PERRLA, conjunctiva and sclera clear  ENMT: No tonsillar erythema, exudates, or enlargement; Moist mucous membranes, Good dentition  NECK: Supple, No JVD  NERVOUS SYSTEM: AOX3, motor and sensation grossly intact in b/l UE and b/l LE  PSYCHIATRIC: Appropriate affect and mood  CHEST/LUNG: s/p bilateral mastectomy, +expiratory wheezing diffusely. No rales, rhonchi, wheezing, or rubs  HEART: Regular rate and rhythm; No murmurs, rubs, or gallops. No LE edema  ABDOMEN: Soft, Nontender, Nondistended; Bowel sounds present  EXTREMITIES:  2+ Peripheral Pulses, No clubbing, cyanosis  SKIN: No rashes or lesions

## 2018-03-25 NOTE — H&P ADULT - PROBLEM SELECTOR PLAN 1
- patient w/ increased SOB/wheezing, was hypoxic to 88% on admission likely 2/2 asthma exacerbation  - has not been using her maintenance inhalers as prescribed, otherwise no clear trigger for asthma exacerbation as no exposures, new pets, travel  - will c/w duonebs q 6, symbicort (patient takes dulera at home), will add singulair (has been on in the past)  - c/w 02 supplementation w/ nasal cannula to maintain 02 > 92%  - will trend peak flow - patient w/ increased SOB/wheezing, was hypoxic to 88% on admission likely 2/2 asthma exacerbation  - has not been using her maintenance inhalers as prescribed, otherwise no clear trigger for asthma exacerbation as no exposures, new pets, travel  - will c/w duonebs q 6, symbicort (patient takes dulera at home), will add singulair (has been on in the past)  - c/w 02 supplementation w/ nasal cannula to maintain 02 > 92%  - will trend peak flow  - RVP negative, CXR w/ clear lungs, not febrile/no leukocytosis so low concern for bacterial PNA, will observe off antibiotics - patient w/ increased SOB/wheezing, was hypoxic to 88% on admission likely 2/2 asthma exacerbation  - has not been using her maintenance inhalers as prescribed, otherwise no clear trigger for asthma exacerbation as no exposures, new pets, travel  - will c/w duonebs q 6, symbicort (patient takes dulera at home), will add singulair (has been on in the past)  - c/w prednisone 40 mg for 2 more days (already got 3 days while in the ED/CDU)  - c/w 02 supplementation w/ nasal cannula to maintain 02 > 92%  - will trend peak flow  - RVP negative, CXR w/ clear lungs, not febrile/no leukocytosis so low concern for bacterial PNA, will observe off antibiotics

## 2018-03-25 NOTE — DISCHARGE NOTE ADULT - MEDICATION SUMMARY - MEDICATIONS TO TAKE
I will START or STAY ON the medications listed below when I get home from the hospital:    predniSONE 20 mg oral tablet  -- 2 tab(s) by mouth once a day  -- Indication: For Asthma with acute exacerbation    albuterol 2.5 mg/3 mL (0.083%) inhalation solution  -- 3 milliliter(s) inhaled every 4 hours, As Needed -  - for shortness of breath and/or wheezing  -- For inhalation only.  It is very important that you take or use this exactly as directed.  Do not skip doses or discontinue unless directed by your doctor.  Obtain medical advice before taking any non-prescription drugs as some may affect the action of this medication.    -- Indication: For Asthma    ProAir HFA 90 mcg/inh inhalation aerosol  -- 2 puff(s) inhaled prn  -- Indication: For Asthma    Dulera 100 mcg-5 mcg/inh inhalation aerosol  -- 2 puff(s) inhaled 2 times a day  -- Indication: For Asthma    amLODIPine 5 mg oral tablet  -- 1 tab(s) by mouth once a day  -- Indication: For HTN (hypertension)    montelukast 10 mg oral tablet  -- 1 tab(s) by mouth once a day  -- Indication: For Asthma

## 2018-03-25 NOTE — DISCHARGE NOTE ADULT - PROVIDER TOKENS
FREE:[LAST:[Anaid],PHONE:[phone]],FAX:[(   )    -]] FREE:[LAST:[Anaid],PHONE:[phone]],FAX:[(   )    -]],TOKEN:'[id number]'

## 2018-03-25 NOTE — H&P ADULT - NSHPLABSRESULTS_GEN_ALL_CORE
LABS:      Ca    8.5        24 Mar 2018 00:23        CAPILLARY BLOOD GLUCOSE        BLOOD CULTURE    RADIOLOGY & ADDITIONAL TESTS:    Imaging Personally Reviewed:  [x] YES - CXR clear lungs  EKG sinus 86 no ST changes - personally reviewed

## 2018-03-25 NOTE — DISCHARGE NOTE ADULT - CARE PLAN
Principal Discharge DX:	Exacerbation of asthma, unspecified asthma severity, unspecified whether persistent  Goal:	To treat your asthma  Assessment and plan of treatment:	You were admitted with an asthma exacerbation.  You were treated with nebulizer treatments and oral steroids. Please continue your steroids for 1 more day.  Please take your albuterol inhaler and nebulizer as needed and your Dulera twice a day.  Please follow up with Dr. Worrell within 1 week of discharge.  Secondary Diagnosis:	HTN (hypertension)  Assessment and plan of treatment:	Please continue your amlodipine as prescribed and follow up with your primary care doctor. Principal Discharge DX:	Exacerbation of asthma, unspecified asthma severity, unspecified whether persistent  Goal:	To treat your asthma  Assessment and plan of treatment:	You were admitted with an asthma exacerbation.  You were treated with nebulizer treatments and oral steroids. Please continue your steroids for 1 more day.  Please take your albuterol inhaler and nebulizer as needed and your Dulera twice a day.  Please follow up with Dr. Worrell within 1 week of discharge or if you prefer, follow up with Dr. Roberts at [facility] by calling [phone].  Secondary Diagnosis:	HTN (hypertension)  Goal:	Management  Assessment and plan of treatment:	Please continue your amlodipine as prescribed and follow up with your primary care doctor.

## 2018-03-25 NOTE — H&P ADULT - PROBLEM SELECTOR PLAN 3
- home regimen: amlodipine 5 mg daily  - c/w amlodipine, if persistently hypertensive will increase to 10 mg daily.

## 2018-03-25 NOTE — H&P ADULT - ASSESSMENT
67F PMH asthma, breast cancer (s/p bilateral mastectomy, in remission), HTN, HLD presents with cough, wheezing, and shortness of breath for the past three days likely 2/2 asthma exacerbation.

## 2018-03-25 NOTE — H&P ADULT - PROBLEM SELECTOR PLAN 5
- patient requesting discharge to Nursing home as she feels she is a burden to daughter who she lives with since her daughter has DM1 and has two young children.  - discussed with patient that she is not a candidate for nursing home given she is independent in ADLs, will place social work consult to provide patient with information about [facility] vs. assisted living

## 2018-03-25 NOTE — ED CDU PROVIDER SUBSEQUENT DAY NOTE - ATTENDING CONTRIBUTION TO CARE
66 y/o f presented to ed with asthma exacerbation in setting of uri symptoms, sent to cdu for monitoring and further treatment. iLsa 68 y/o f presented to ed with asthma exacerbation in setting of uri symptoms, sent to cdu for monitoring and further treatment. Overnight-patient was ok until she went to bathroom. STates in the bathroom she washed herself up, became more sob with wheezing. Evaluated by cdu pa and overnight attending, mildly hypoxic at the time in low 90's. Was strongly recommended to patient at that time that she be admitted but she declined multiple times. On our eval this morning, patients o2 sat ok, resp status improved, states shes feeling much better. exam-nad, ao3, rrr, normal resp effort with no accessory muscle use, wheezing b/l, abd soft nt/nd, ext with from, no rash, speech clear.  Reencouraged patient to accept admission given desats and symptoms after mild exertion but continues to refuse. Agreeable to be observed a bit longer but then would like to go home. All my concerns regarding decline of respiratory status and danger if discharged discussed with patient, she fully understands, is able to repeat it back to me, but still does not want admission. Will continue to monitor, give nebs/steroids, reass shortly.

## 2018-03-25 NOTE — ED CDU PROVIDER DISPOSITION NOTE - CLINICAL COURSE
66 y/o f presented to ed with asthma exacerbation in setting of uri symptoms, sent to cdu for monitoring and further treatment. Overnight-patient was ok until she went to bathroom. STates in the bathroom she washed herself up, became more sob with wheezing. Evaluated by cdu pa and overnight attending, mildly hypoxic at the time in low 90's. Was strongly recommended to patient at that time that she be admitted but she declined multiple times. On our eval this morning, patients o2 sat ok, resp status improved, states shes feeling much better, ambulated without difficulty and no desat. exam-nad, ao3, rrr, normal resp effort with no accessory muscle use, wheezing b/l, abd soft nt/nd, ext with from, no rash, speech clear.  Reencouraged patient to accept admission given desats overnight and symptoms after mild exertion but continues to refuse.  All my concerns regarding decline of respiratory status and danger if discharged of further disability and even death discussed with patient, she fully understands, is able to repeat it back to me, but still does not want admission. Patient instructed to continue neb treatments and steroids, have expeditious f/u with pcp/pulm within next few days for reeval-symptoms to return for discussed in detail as well as that she may return to the ed at any time if she changes her mind. 66 y/o f presented to ed with asthma exacerbation in setting of uri symptoms, sent to cdu for monitoring and further treatment. Overnight-patient was ok until she went to bathroom. STates in the bathroom she washed herself up, became more sob with wheezing. Evaluated by cdu pa and overnight attending, mildly hypoxic at the time in low 90's. Was strongly recommended to patient at that time that she be admitted but she declined multiple times. On our eval this morning, patients o2 sat ok, resp status improved, states shes feeling much better, ambulated without difficulty and no desat. exam-nad, ao3, rrr, normal resp effort with no accessory muscle use, wheezing b/l, abd soft nt/nd, ext with from, no rash, speech clear.  Reencouraged patient to accept admission given desats overnight and symptoms after mild exertion but continues to refuse.  All my concerns regarding decline of respiratory status, patient agreeable to admission at this point.

## 2018-03-25 NOTE — H&P ADULT - HISTORY OF PRESENT ILLNESS
67F PMH asthma, breast cancer (s/p bilateral mastectomy, in remission), HTN, HLD presents with cough, wheezing, and shortness of breath for the past three days.  She also has pleuritic chest pain.    Denies fevers, chills, sick contacts, recent travel, nasal congestion, muscle aches, nausea, vomiting, diarrhea.  She has a history of asthma with prior ED visits and hospitalizations for asthma exacerbations.  For her asthma, she takes albuterol PRN and Dulera (Formoterol and mometasone) - she is supposed to take Dulera twice a day, however she only takes it once a day if she feels like her breathing is good and she is not having an exacerbation.  She used her albuterol multiple times without relief so came to the ED.      In the ED VS 98.1 96 172/92 22 88% RA -> 97% w/ 2L NC  Labs w/ WBC 10.2, hgb 14.7, .  Cr 0.74 (baseline). RVP negative  CXR w/ clear lungs  EKG w sinus 86 no ST changes     In the ED given albuterol nebs, prednisone 60 mg x 1.  Was admitted to CDU where she continued to receive albuterol nebulizers and prednisone 50 mg daily.  Of note, patient received Robitussin in the ED for cough and immediately had a rash and itching on bilateral hands, was given benadryl with relief.  ED also ordered CT chest to r/o underlying PNA given wheezing and cough not improved with albuterol however patient refused.  Patient initially refused to be admitted to the hospital, however had a desat overnight while ambulating to the bathroom, and agreed to be admitted.

## 2018-03-25 NOTE — ED CDU PROVIDER DISPOSITION NOTE - ATTENDING CONTRIBUTION TO CARE
68 y/o f presented to ed with asthma exacerbation in setting of uri symptoms, sent to cdu for monitoring and further treatment. Overnight-patient was ok until she went to bathroom. STates in the bathroom she washed herself up, became more sob with wheezing. Evaluated by cdu pa and overnight attending, mildly hypoxic at the time in low 90's. Was strongly recommended to patient at that time that she be admitted but she declined multiple times. On our eval this morning, patients o2 sat ok, resp status improved, states shes feeling much better, ambulated without difficulty and no desat. exam-nad, ao3, rrr, normal resp effort with no accessory muscle use, wheezing b/l, abd soft nt/nd, ext with from, no rash, speech clear.  Reencouraged patient to accept admission given desats overnight and symptoms after mild exertion but continues to refuse.  All my concerns regarding decline of respiratory status and danger if discharged of further disability and even death discussed with patient, she fully understands, is able to repeat it back to me, but still does not want admission. Patient instructed to continue neb treatments and steroids, have expeditious f/u with pcp/pulm within next few days for reeval-symptoms to return for discussed in detail as well as that she may return to the ed at any time if she changes her mind. ao3 at time of eval, and vss. 68 y/o f presented to ed with asthma exacerbation in setting of uri symptoms, sent to cdu for monitoring and further treatment. Overnight-patient was ok until she went to bathroom. STates in the bathroom she washed herself up, became more sob with wheezing. Evaluated by cdu pa and overnight attending, mildly hypoxic at the time in low 90's. Was strongly recommended to patient at that time that she be admitted but she declined multiple times. On our eval this morning, patients o2 sat ok, resp status improved, states shes feeling much better, ambulated without difficulty and no desat. exam-nad, ao3, rrr, normal resp effort with no accessory muscle use, wheezing b/l, abd soft nt/nd, ext with from, no rash, speech clear.  Reencouraged patient to accept admission given desats overnight and symptoms after mild exertion but continues to refuse.  All my concerns regarding decline of respiratory status and danger if discharged of further decline as again desaturated to 94 percent after ambulation (resolved after few minutes).  Patient now agreeable to admission.

## 2018-03-25 NOTE — DISCHARGE NOTE ADULT - CARE PROVIDERS DIRECT ADDRESSES
,DirectAddress_Unknown ,DirectAddress_Unknown,[email or web address]

## 2018-03-25 NOTE — DISCHARGE NOTE ADULT - HOSPITAL COURSE
67F PMH asthma, breast cancer (s/p bilateral mastectomy, in remission), HTN, HLD presents with cough, wheezing, and shortness of breath for the past three days.  She also has pleuritic chest pain.    Denies fevers, chills, sick contacts, recent travel, nasal congestion, muscle aches, nausea, vomiting, diarrhea.  She has a history of asthma with prior ED visits and hospitalizations for asthma exacerbations.  For her asthma, she takes albuterol PRN and Dulera (Formoterol and mometasone) - she is supposed to take Dulera twice a day, however she only takes it once a day if she feels like her breathing is good and she is not having an exacerbation.  She used her albuterol multiple times without relief so came to the ED.      In the ED VS 98.1 96 172/92 22 88% RA -> 97% w/ 2L NC  Labs w/ WBC 10.2, hgb 14.7, .  Cr 0.74 (baseline). RVP negative  CXR w/ clear lungs  EKG w sinus 86 no ST changes     In the ED given albuterol nebs, prednisone 60 mg x 1.  Was admitted to CDU where she continued to receive albuterol nebulizers and prednisone 50 mg daily.  Of note, patient received Robitussin in the ED for cough and immediately had a rash and itching on bilateral hands, was given benadryl with relief.  ED also ordered CT chest to r/o underlying PNA given wheezing and cough not improved with albuterol however patient refused.  Patient initially refused to be admitted to the hospital, however had a desat overnight while ambulating to the bathroom, and agreed to be admitted.    Patient was admitted - continued on prednisone 40 mg daily for total of 5 days.  She was continued on Duonebs every 6 hours and Symbicort BID.  She was started on Singulair.  Patient was discharged 3/25/18 in stable condition and will f/u with her primary care doctor. 67F PMH asthma, breast cancer (s/p bilateral mastectomy, in remission), HTN, HLD presents with cough, wheezing, and shortness of breath for the past three days.  She also has pleuritic chest pain.    Denies fevers, chills, sick contacts, recent travel, nasal congestion, muscle aches, nausea, vomiting, diarrhea.  She has a history of asthma with prior ED visits and hospitalizations for asthma exacerbations.  For her asthma, she takes albuterol PRN and Dulera (Formoterol and mometasone) - she is supposed to take Dulera twice a day, however she only takes it once a day if she feels like her breathing is good and she is not having an exacerbation.  She used her albuterol multiple times without relief so came to the ED.      In the ED VS 98.1 96 172/92 22 88% RA -> 97% w/ 2L NC  Labs w/ WBC 10.2, hgb 14.7, .  Cr 0.74 (baseline). RVP negative  CXR w/ clear lungs  EKG w sinus 86 no ST changes     In the ED given albuterol nebs, prednisone 60 mg x 1.  Was admitted to CDU where she continued to receive albuterol nebulizers and prednisone 50 mg daily.  Of note, patient received Robitussin in the ED for cough and immediately had a rash and itching on bilateral hands, was given benadryl with relief.  ED also ordered CT chest to r/o underlying PNA given wheezing and cough not improved with albuterol however patient refused.  Patient initially refused to be admitted to the hospital, however had a desat overnight while ambulating to the bathroom, and agreed to be admitted.    Patient was admitted - continued on prednisone 40 mg daily for total of 5 days.  She was continued on Duonebs every 6 hours and Symbicort BID.  She was started on Singulair.  Patient was discharged 3/25/18 in stable condition and will f/u with her primary care doctor and Pulmonologist.

## 2018-03-25 NOTE — DISCHARGE NOTE ADULT - CARE PROVIDER_API CALL
Anaid,   Phone: [phone]  Fax: (   )    - Anaid,   Phone: [phone]  Fax: (   )    -    Amaury Roberts (MD), Medicine  Pulmonary Medicine  [address] [place] [address]  Phone: [phone]  Fax: [phone]

## 2018-03-26 VITALS
HEART RATE: 71 BPM | RESPIRATION RATE: 18 BRPM | OXYGEN SATURATION: 95 % | SYSTOLIC BLOOD PRESSURE: 144 MMHG | DIASTOLIC BLOOD PRESSURE: 80 MMHG

## 2018-03-26 LAB
ALBUMIN SERPL ELPH-MCNC: 3.6 G/DL — SIGNIFICANT CHANGE UP (ref 3.3–5)
ALP SERPL-CCNC: 81 U/L — SIGNIFICANT CHANGE UP (ref 40–120)
ALT FLD-CCNC: 10 U/L — SIGNIFICANT CHANGE UP (ref 4–33)
AST SERPL-CCNC: 17 U/L — SIGNIFICANT CHANGE UP (ref 4–32)
BILIRUB SERPL-MCNC: 0.3 MG/DL — SIGNIFICANT CHANGE UP (ref 0.2–1.2)
BUN SERPL-MCNC: 14 MG/DL — SIGNIFICANT CHANGE UP (ref 7–23)
CALCIUM SERPL-MCNC: 8.9 MG/DL — SIGNIFICANT CHANGE UP (ref 8.4–10.5)
CHLORIDE SERPL-SCNC: 104 MMOL/L — SIGNIFICANT CHANGE UP (ref 98–107)
CO2 SERPL-SCNC: 27 MMOL/L — SIGNIFICANT CHANGE UP (ref 22–31)
CREAT SERPL-MCNC: 0.86 MG/DL — SIGNIFICANT CHANGE UP (ref 0.5–1.3)
GLUCOSE SERPL-MCNC: 93 MG/DL — SIGNIFICANT CHANGE UP (ref 70–99)
HCT VFR BLD CALC: 40.7 % — SIGNIFICANT CHANGE UP (ref 34.5–45)
HGB BLD-MCNC: 13.1 G/DL — SIGNIFICANT CHANGE UP (ref 11.5–15.5)
MCHC RBC-ENTMCNC: 28.7 PG — SIGNIFICANT CHANGE UP (ref 27–34)
MCHC RBC-ENTMCNC: 32.2 % — SIGNIFICANT CHANGE UP (ref 32–36)
MCV RBC AUTO: 89.3 FL — SIGNIFICANT CHANGE UP (ref 80–100)
NRBC # FLD: 0 — SIGNIFICANT CHANGE UP
PLATELET # BLD AUTO: 271 K/UL — SIGNIFICANT CHANGE UP (ref 150–400)
PMV BLD: 10.3 FL — SIGNIFICANT CHANGE UP (ref 7–13)
POTASSIUM SERPL-MCNC: 4.2 MMOL/L — SIGNIFICANT CHANGE UP (ref 3.5–5.3)
POTASSIUM SERPL-SCNC: 4.2 MMOL/L — SIGNIFICANT CHANGE UP (ref 3.5–5.3)
PROT SERPL-MCNC: 6.7 G/DL — SIGNIFICANT CHANGE UP (ref 6–8.3)
RBC # BLD: 4.56 M/UL — SIGNIFICANT CHANGE UP (ref 3.8–5.2)
RBC # FLD: 13.4 % — SIGNIFICANT CHANGE UP (ref 10.3–14.5)
SODIUM SERPL-SCNC: 142 MMOL/L — SIGNIFICANT CHANGE UP (ref 135–145)
WBC # BLD: 7.3 K/UL — SIGNIFICANT CHANGE UP (ref 3.8–10.5)
WBC # FLD AUTO: 7.3 K/UL — SIGNIFICANT CHANGE UP (ref 3.8–10.5)

## 2018-03-26 PROCEDURE — 99239 HOSP IP/OBS DSCHRG MGMT >30: CPT

## 2018-03-26 RX ORDER — ALBUTEROL 90 UG/1
3 AEROSOL, METERED ORAL
Qty: 1 | Refills: 0 | OUTPATIENT
Start: 2018-03-26 | End: 2018-04-24

## 2018-03-26 RX ORDER — MONTELUKAST 4 MG/1
1 TABLET, CHEWABLE ORAL
Qty: 30 | Refills: 0
Start: 2018-03-26 | End: 2018-04-24

## 2018-03-26 RX ORDER — ALBUTEROL 90 UG/1
2 AEROSOL, METERED ORAL
Qty: 0 | Refills: 0 | COMMUNITY

## 2018-03-26 RX ORDER — MOMETASONE FUROATE AND FORMOTEROL FUMARATE DIHYDRATE 200; 5 UG/1; UG/1
2 AEROSOL RESPIRATORY (INHALATION)
Qty: 0 | Refills: 0 | COMMUNITY

## 2018-03-26 RX ORDER — ALBUTEROL 90 UG/1
2 AEROSOL, METERED ORAL
Qty: 1 | Refills: 0 | OUTPATIENT
Start: 2018-03-26 | End: 2018-04-24

## 2018-03-26 RX ORDER — MOMETASONE FUROATE AND FORMOTEROL FUMARATE DIHYDRATE 200; 5 UG/1; UG/1
2 AEROSOL RESPIRATORY (INHALATION)
Qty: 1 | Refills: 0
Start: 2018-03-26 | End: 2018-04-24

## 2018-03-26 RX ADMIN — MONTELUKAST 10 MILLIGRAM(S): 4 TABLET, CHEWABLE ORAL at 11:10

## 2018-03-26 RX ADMIN — Medication 3 MILLILITER(S): at 10:38

## 2018-03-26 RX ADMIN — Medication 40 MILLIGRAM(S): at 05:59

## 2018-03-26 RX ADMIN — Medication 3 MILLILITER(S): at 03:47

## 2018-03-26 RX ADMIN — BUDESONIDE AND FORMOTEROL FUMARATE DIHYDRATE 2 PUFF(S): 160; 4.5 AEROSOL RESPIRATORY (INHALATION) at 09:04

## 2018-03-26 RX ADMIN — AMLODIPINE BESYLATE 5 MILLIGRAM(S): 2.5 TABLET ORAL at 05:59

## 2018-03-26 NOTE — PROGRESS NOTE ADULT - SUBJECTIVE AND OBJECTIVE BOX
Sheldon Galvez MD, Internal Medicine, PGY1  Pager - [facility]: [phone] ; [facility]: [phone]    Patient is a 67y old  Female who presents with a chief complaint of asthma exacerbation (25 Mar 2018 15:38)        SUBJECTIVE / OVERNIGHT EVENTS: NAEON. Patient breathing comfortably on RA. Denies any HA, lightheadedness, CP, SOB, abd pain, N/V      MEDICATIONS  (STANDING):  ALBUTerol/ipratropium for Nebulization 3 milliLiter(s) Nebulizer every 6 hours  amLODIPine   Tablet 5 milliGRAM(s) Oral daily  buDESOnide  80 MICROgram(s)/formoterol 4.5 MICROgram(s) Inhaler 2 Puff(s) Inhalation two times a day  montelukast 10 milliGRAM(s) Oral daily  predniSONE   Tablet 40 milliGRAM(s) Oral daily    MEDICATIONS  (PRN):      T(C): 36.8 (03-26-18 @ 05:57), Max: 36.9 (03-25-18 @ 16:01)  T(F): 98.2 (03-26-18 @ 05:57), Max: 98.5 (03-25-18 @ 16:01)  HR: 65 (03-26-18 @ 03:48) (65 - 91)  BP: 137/65 (03-26-18 @ 05:57) (131/67 - 173/89)  ABP: --  ABP(mean): --  RR: 17 (03-26-18 @ 05:57) (17 - 20)  SpO2: 95% (03-26-18 @ 05:57) (93% - 96%)    CAPILLARY BLOOD GLUCOSE        I&O's Summary      PHYSICAL EXAM  GENERAL: NAD, well-developed  HEAD:  Atraumatic, Normocephalic  EYES: EOMI, conjunctiva and sclera clear  NECK: Supple, No JVD  CHEST/LUNG: Wheezing b/l  HEART: Regular rate and rhythm; No murmurs, rubs, or gallops  ABDOMEN: Soft, Nontender, Nondistended; Bowel sounds present  EXTREMITIES:  2+ Peripheral Pulses, No clubbing, cyanosis, or edema  NEURO:  AOx3, No focal deficits  PSYCH: Appropriate mood and affect  SKIN: No rashes or lesions    LABS:                        13.1   7.30  )-----------( 271      ( 26 Mar 2018 05:50 )             40.7     03-26    142  |  104  |  14  ----------------------------<  93  4.2   |  27  |  0.86    Ca    8.9      26 Mar 2018 05:50    TPro  6.7  /  Alb  3.6  /  TBili  0.3  /  DBili  x   /  AST  17  /  ALT  10  /  AlkPhos  81  03-26              RADIOLOGY & ADDITIONAL TESTS:    Imaging Personally Reviewed:  Consultant(s) Notes Reviewed:    Care Discussed with Consultants/Other Providers:

## 2018-03-26 NOTE — PROGRESS NOTE ADULT - ATTENDING COMMENTS
Pt seen and examined by me Agree with resident  1) Asthma exacerbation - likely secondary to medication noncompliance  pt reports she had stopped her Asthma meds as few days prior to the attack  pt clinically improving- Able to speak in full sentences,ambulating and saturating 95-98 % on RA  Minimal wheezes on exam  Continue Predni x 5 day, Nebs, Singulair  Pt sees outpt Pulmonogist Dr Faith  Medication compliance and Pulmonology FU reinforced to pt   2) DC planning 40 mins

## 2018-03-26 NOTE — PROGRESS NOTE ADULT - PROBLEM SELECTOR PLAN 1
Improving with therapy.  - patient w/ increased SOB/wheezing, was hypoxic to 88% on admission likely 2/2 asthma exacerbation  - has not been using her maintenance inhalers as prescribed, otherwise no clear trigger for asthma exacerbation as no exposures, new pets, travel  - will c/w duonebs q 6, symbicort (patient takes dulera at home), will add singulair (has been on in the past)  - c/w prednisone 40 mg for 2 more days (3/23-3/27)  - c/w O2 supplementation PRN to maintain O2 > 92%  - RVP negative, CXR w/ clear lungs, not febrile/no leukocytosis so low concern for bacterial PNA, will observe off antibiotics

## 2018-06-15 NOTE — ED ADULT TRIAGE NOTE - CCCP TRG CHIEF CMPLNT
HPI     DLS:  06/07/2018 Dr. Pacheco    Pt state that she was referred here by the doctor down stairs for left eye   have trouble with near reading vision.  Pt did not notice any sig change   in vision until had exam with Dr Pacheco.  Had eye exam about 2 yrs ago.    No f/f/pain OU.      No flashes or floaters   No itching burning or tearing  No diplopia    Eye Med(s): none    Last edited by Johnathan Christopher MD on 6/15/2018  1:45 PM. (History)        [place] SDOCT:   OD: good quality, VMT  OS: good quality, macular hole with cystic changes, vit attached      Assessment /Plan     For exam results, see Encounter Report.    Macular hole, full thickness, left    Vitreomacular traction syndrome of right eye    Nuclear sclerosis of both eyes    observe VMT OD    Cat extraction eventually OU    Discussed MH surgery OS extensively.  RBA, post op positioning, Va expectations ishaan with unkown duration, cat    RBA discussed in detail, inc surgical failure, need for more surgery, loss of vision/eye, no recovery of vision, surgical failure, bleeding, infection, high eye pressure (glaucoma) cataract, etc.  Pt wishes to proceed.    25 ga PPM/ILM peel OS  Local MAC             
asthma exacerbation

## 2018-12-13 NOTE — DISCHARGE NOTE ADULT - PLAN OF CARE
Speaking Coherently resolve You were admitted with a suspected asthma exacerbation.  You were treated with prednisone 40 mg and nebulizer treatment with supplemental oxygen.  Please follow up with your outpatient pulmonologist. maintain Please follow up with your outpatient primary care physician.  Please take your medications as prescribed. Orthopedic You were admitted with a suspected asthma exacerbation.  You were treated with prednisone 40 mg and nebulizer treatment with supplemental oxygen.  Please take your medications as prescribed.  Please follow up with your outpatient pulmonologist.

## 2019-03-31 ENCOUNTER — TRANSCRIPTION ENCOUNTER (OUTPATIENT)
Age: 69
End: 2019-03-31

## 2019-03-31 ENCOUNTER — INPATIENT (INPATIENT)
Facility: HOSPITAL | Age: 69
LOS: 3 days | Discharge: HOME | End: 2019-04-04
Attending: INTERNAL MEDICINE | Admitting: INTERNAL MEDICINE
Payer: MEDICARE

## 2019-03-31 VITALS
DIASTOLIC BLOOD PRESSURE: 126 MMHG | HEIGHT: 65 IN | SYSTOLIC BLOOD PRESSURE: 192 MMHG | WEIGHT: 154.98 LBS | HEART RATE: 113 BPM | RESPIRATION RATE: 20 BRPM | TEMPERATURE: 99 F | OXYGEN SATURATION: 96 %

## 2019-03-31 DIAGNOSIS — I10 ESSENTIAL (PRIMARY) HYPERTENSION: ICD-10-CM

## 2019-03-31 DIAGNOSIS — J45.41 MODERATE PERSISTENT ASTHMA WITH (ACUTE) EXACERBATION: ICD-10-CM

## 2019-03-31 DIAGNOSIS — J96.01 ACUTE RESPIRATORY FAILURE WITH HYPOXIA: ICD-10-CM

## 2019-03-31 LAB
ALBUMIN SERPL ELPH-MCNC: 3.5 G/DL — SIGNIFICANT CHANGE UP (ref 3.3–5)
ALP SERPL-CCNC: 128 U/L — HIGH (ref 40–120)
ALT FLD-CCNC: 13 U/L — SIGNIFICANT CHANGE UP (ref 12–78)
ANION GAP SERPL CALC-SCNC: 8 MMOL/L — SIGNIFICANT CHANGE UP (ref 5–17)
APTT BLD: 29.1 SEC — SIGNIFICANT CHANGE UP (ref 27.5–36.3)
AST SERPL-CCNC: 24 U/L — SIGNIFICANT CHANGE UP (ref 15–37)
BASE EXCESS BLDA CALC-SCNC: 2.1 MMOL/L — HIGH (ref -2–2)
BILIRUB SERPL-MCNC: 0.8 MG/DL — SIGNIFICANT CHANGE UP (ref 0.2–1.2)
BLOOD GAS COMMENTS: SIGNIFICANT CHANGE UP
BLOOD GAS COMMENTS: SIGNIFICANT CHANGE UP
BLOOD GAS SOURCE: SIGNIFICANT CHANGE UP
BUN SERPL-MCNC: 7 MG/DL — SIGNIFICANT CHANGE UP (ref 7–23)
CALCIUM SERPL-MCNC: 9.2 MG/DL — SIGNIFICANT CHANGE UP (ref 8.5–10.1)
CHLORIDE SERPL-SCNC: 107 MMOL/L — SIGNIFICANT CHANGE UP (ref 96–108)
CO2 SERPL-SCNC: 26 MMOL/L — SIGNIFICANT CHANGE UP (ref 22–31)
CREAT SERPL-MCNC: 0.9 MG/DL — SIGNIFICANT CHANGE UP (ref 0.5–1.3)
GLUCOSE SERPL-MCNC: 98 MG/DL — SIGNIFICANT CHANGE UP (ref 70–99)
HCO3 BLDA-SCNC: 26 MMOL/L — SIGNIFICANT CHANGE UP (ref 21–29)
HCT VFR BLD CALC: 45 % — SIGNIFICANT CHANGE UP (ref 34.5–45)
HGB BLD-MCNC: 14.6 G/DL — SIGNIFICANT CHANGE UP (ref 11.5–15.5)
HOROWITZ INDEX BLDA+IHG-RTO: 28 — SIGNIFICANT CHANGE UP
INR BLD: 1.08 RATIO — SIGNIFICANT CHANGE UP (ref 0.88–1.16)
MAGNESIUM SERPL-MCNC: 2.1 MG/DL — SIGNIFICANT CHANGE UP (ref 1.6–2.6)
MCHC RBC-ENTMCNC: 29.1 PG — SIGNIFICANT CHANGE UP (ref 27–34)
MCHC RBC-ENTMCNC: 32.4 GM/DL — SIGNIFICANT CHANGE UP (ref 32–36)
MCV RBC AUTO: 89.8 FL — SIGNIFICANT CHANGE UP (ref 80–100)
NRBC # BLD: 0 /100 WBCS — SIGNIFICANT CHANGE UP (ref 0–0)
NT-PROBNP SERPL-SCNC: 65 PG/ML — SIGNIFICANT CHANGE UP (ref 0–125)
PCO2 BLDA: 37 MMHG — SIGNIFICANT CHANGE UP (ref 32–46)
PH BLD: 7.45 — SIGNIFICANT CHANGE UP (ref 7.35–7.45)
PLATELET # BLD AUTO: 222 K/UL — SIGNIFICANT CHANGE UP (ref 150–400)
PO2 BLDA: 63 MMHG — LOW (ref 74–108)
POTASSIUM SERPL-MCNC: 4 MMOL/L — SIGNIFICANT CHANGE UP (ref 3.5–5.3)
POTASSIUM SERPL-SCNC: 4 MMOL/L — SIGNIFICANT CHANGE UP (ref 3.5–5.3)
PROT SERPL-MCNC: 7.8 GM/DL — SIGNIFICANT CHANGE UP (ref 6–8.3)
PROTHROM AB SERPL-ACNC: 12.1 SEC — SIGNIFICANT CHANGE UP (ref 10–12.9)
RBC # BLD: 5.01 M/UL — SIGNIFICANT CHANGE UP (ref 3.8–5.2)
RBC # FLD: 12.3 % — SIGNIFICANT CHANGE UP (ref 10.3–14.5)
SAO2 % BLDA: 92 % — SIGNIFICANT CHANGE UP (ref 92–96)
SODIUM SERPL-SCNC: 141 MMOL/L — SIGNIFICANT CHANGE UP (ref 135–145)
TROPONIN I SERPL-MCNC: <.015 NG/ML — SIGNIFICANT CHANGE UP (ref 0.01–0.04)
WBC # BLD: 12.76 K/UL — HIGH (ref 3.8–10.5)
WBC # FLD AUTO: 12.76 K/UL — HIGH (ref 3.8–10.5)

## 2019-03-31 PROCEDURE — 93010 ELECTROCARDIOGRAM REPORT: CPT

## 2019-03-31 PROCEDURE — 71045 X-RAY EXAM CHEST 1 VIEW: CPT | Mod: 26

## 2019-03-31 PROCEDURE — 99285 EMERGENCY DEPT VISIT HI MDM: CPT

## 2019-03-31 RX ORDER — ENOXAPARIN SODIUM 100 MG/ML
40 INJECTION SUBCUTANEOUS EVERY 24 HOURS
Qty: 0 | Refills: 0 | Status: DISCONTINUED | OUTPATIENT
Start: 2019-03-31 | End: 2019-04-04

## 2019-03-31 RX ORDER — DOCUSATE SODIUM 100 MG
100 CAPSULE ORAL THREE TIMES A DAY
Qty: 0 | Refills: 0 | Status: DISCONTINUED | OUTPATIENT
Start: 2019-03-31 | End: 2019-04-04

## 2019-03-31 RX ORDER — MAGNESIUM SULFATE 500 MG/ML
2 VIAL (ML) INJECTION ONCE
Qty: 0 | Refills: 0 | Status: COMPLETED | OUTPATIENT
Start: 2019-03-31 | End: 2019-03-31

## 2019-03-31 RX ORDER — IPRATROPIUM/ALBUTEROL SULFATE 18-103MCG
3 AEROSOL WITH ADAPTER (GRAM) INHALATION EVERY 6 HOURS
Qty: 0 | Refills: 0 | Status: DISCONTINUED | OUTPATIENT
Start: 2019-03-31 | End: 2019-04-04

## 2019-03-31 RX ORDER — PANTOPRAZOLE SODIUM 20 MG/1
40 TABLET, DELAYED RELEASE ORAL
Qty: 0 | Refills: 0 | Status: DISCONTINUED | OUTPATIENT
Start: 2019-03-31 | End: 2019-04-04

## 2019-03-31 RX ORDER — IPRATROPIUM/ALBUTEROL SULFATE 18-103MCG
3 AEROSOL WITH ADAPTER (GRAM) INHALATION
Qty: 0 | Refills: 0 | Status: COMPLETED | OUTPATIENT
Start: 2019-03-31 | End: 2019-03-31

## 2019-03-31 RX ORDER — AMLODIPINE BESYLATE 2.5 MG/1
10 TABLET ORAL ONCE
Qty: 0 | Refills: 0 | Status: COMPLETED | OUTPATIENT
Start: 2019-03-31 | End: 2019-03-31

## 2019-03-31 RX ORDER — MONTELUKAST 4 MG/1
10 TABLET, CHEWABLE ORAL DAILY
Qty: 0 | Refills: 0 | Status: DISCONTINUED | OUTPATIENT
Start: 2019-03-31 | End: 2019-04-04

## 2019-03-31 RX ORDER — ALBUTEROL 90 UG/1
1 AEROSOL, METERED ORAL EVERY 4 HOURS
Qty: 0 | Refills: 0 | Status: DISCONTINUED | OUTPATIENT
Start: 2019-03-31 | End: 2019-04-04

## 2019-03-31 RX ORDER — TIOTROPIUM BROMIDE 18 UG/1
1 CAPSULE ORAL; RESPIRATORY (INHALATION) DAILY
Qty: 0 | Refills: 0 | Status: DISCONTINUED | OUTPATIENT
Start: 2019-03-31 | End: 2019-04-04

## 2019-03-31 RX ORDER — AMLODIPINE BESYLATE 2.5 MG/1
5 TABLET ORAL DAILY
Qty: 0 | Refills: 0 | Status: DISCONTINUED | OUTPATIENT
Start: 2019-03-31 | End: 2019-04-02

## 2019-03-31 RX ORDER — SODIUM CHLORIDE 9 MG/ML
1000 INJECTION, SOLUTION INTRAVENOUS
Qty: 0 | Refills: 0 | Status: DISCONTINUED | OUTPATIENT
Start: 2019-03-31 | End: 2019-04-02

## 2019-03-31 RX ADMIN — Medication 100 MILLIGRAM(S): at 22:32

## 2019-03-31 RX ADMIN — SODIUM CHLORIDE 70 MILLILITER(S): 9 INJECTION, SOLUTION INTRAVENOUS at 21:13

## 2019-03-31 RX ADMIN — ENOXAPARIN SODIUM 40 MILLIGRAM(S): 100 INJECTION SUBCUTANEOUS at 22:32

## 2019-03-31 RX ADMIN — Medication 3 MILLILITER(S): at 15:39

## 2019-03-31 RX ADMIN — Medication 40 MILLIGRAM(S): at 22:32

## 2019-03-31 RX ADMIN — AMLODIPINE BESYLATE 10 MILLIGRAM(S): 2.5 TABLET ORAL at 15:38

## 2019-03-31 RX ADMIN — Medication 3 MILLILITER(S): at 23:54

## 2019-03-31 RX ADMIN — Medication 125 MILLIGRAM(S): at 16:16

## 2019-03-31 RX ADMIN — Medication 3 MILLILITER(S): at 21:12

## 2019-03-31 RX ADMIN — Medication 50 GRAM(S): at 16:16

## 2019-03-31 NOTE — ED PROVIDER NOTE - PHYSICAL EXAMINATION
Gen: Alert, Well appearing. NAD    Head: NC, AT, PERRL, EOMI, normal lids/conjunctiva   ENT: Bilateral TM WNL, normal hearing, patent oropharynx without erythema/exudate, uvula midline  Neck: supple, no tenderness/meningismus/JVD   Pulm: + diffuse wheeze  CV: RRR, no M/R/G, +dist pulses   Abd: soft, NT/ND, +BS, no guarding/rebound tenderness  Mskel: no edema/erythema/cyanosis   Skin: no rash   Neuro: AAOx3, no sensory/motor deficits, CN 2-12 intact

## 2019-03-31 NOTE — ED ADULT NURSE NOTE - NSIMPLEMENTINTERV_GEN_ALL_ED
Implemented All Universal Safety Interventions:  [place] to call system. Call bell, personal items and telephone within reach. Instruct patient to call for assistance. Room bathroom lighting operational. Non-slip footwear when patient is off stretcher. Physically safe environment: no spills, clutter or unnecessary equipment. Stretcher in lowest position, wheels locked, appropriate side rails in place.

## 2019-03-31 NOTE — H&P ADULT - NSICDXFAMILYHX_GEN_ALL_CORE_FT
FAMILY HISTORY:  Father  Still living? Unknown  Family history of asthma, Age at diagnosis: Age Unknown

## 2019-03-31 NOTE — ED ADULT TRIAGE NOTE - CHIEF COMPLAINT QUOTE
patient c/o of difficulty breathing cough and fever started last night  , patient denied chest pain c/o of back pain , wheezing in ascultation , EMS give nebulizer x2

## 2019-03-31 NOTE — ED PROVIDER NOTE - OBJECTIVE STATEMENT
69yo female with pmh asthma, breast ca s/p bl mastectomy, htn, presents with sob since today. no fever, cp, back pain, cough. Pt did not take antihtn this am.  denies cardiac history. Pt reports she is on prednisone daily.    ROS: No fever/chills. No photophobia/eye pain/changes in vision, No ear pain/sore throat/dysphagia, No chest pain/palpitations. +SOB, no cough/stridor. No abdominal pain, N/V/D, no black/bloody bm. No dysuria/frequency/discharge, No headache. No Dizziness.  No rash.  No numbness/tingling/weakness.

## 2019-03-31 NOTE — H&P ADULT - NSHPPHYSICALEXAM_GEN_ALL_CORE
PHYSICAL EXAM:  GENERAL: NAD, well-groomed, well-developed  HEAD:  Atraumatic, Normocephalic  EYES: EOMI, PERRLA, conjunctiva and sclera clear  ENMT: No tonsillar erythema, exudates, or enlargement; Moist mucous membranes, No lesions  NECK: Supple, No JVD, Normal thyroid  NERVOUS SYSTEM:  Alert & Oriented X3; Motor Strength 5/5 B/L upper and lower extremities; DTRs 2+ intact and symmetric  CHEST/LUNG: Wheezing  bilaterally; No rales, rhonchi,or rubs  HEART: Regular rate and rhythm; No murmurs, rubs, or gallops  ABDOMEN: Soft, Nontender, Nondistended; Bowel sounds present  EXTREMITIES:  2+ Peripheral Pulses, No clubbing, cyanosis, or edema  LYMPH: No lymphadenopathy noted  SKIN: No rashes or lesions

## 2019-03-31 NOTE — CONSULT NOTE ADULT - SUBJECTIVE AND OBJECTIVE BOX
Patient is a 68y old  Female who presents with a chief complaint of sob and wheezing.    HPI:  68 year female with HTN, HLD, Asthma for last 10 -12 years, Breast CA S/P bilateral Mastectomy and Anxiety.  Came with one day of sob and wheezing, had feeling as  her throat was closing up on her. Denies any high fever, chills, cough or sputum production.  Denies any recent URI. Home meds include Breo Ellipta, Albuterol and as needed prednisone.   Denies tobacco abuse.    PAST MEDICAL & SURGICAL HISTORY:  Anxiety  Asthma  HTN (hypertension)  H/O mastectomy, bilateral  S/P D&C (status post dilation and curettage): x 2 many years ago  H/O hand surgery: 1980&#x27;s right    FAMILY HISTORY:  Family history of asthma (Father)    SOCIAL HISTORY: BMI (kg/m2): 25.8 , Denies tobacco abuse    Allergies  penicillin (Swelling)  Robitussin (Pruritus)    MEDICATIONS  (STANDING): BREO Ellipta, Albuterol, prednisone.    REVIEW OF SYSTEMS:    Constitutional:            No fever, weight loss or fatigue  HEENT:                        No difficulty hearing, tinnitus, vertigo; No sinus or throat pain  Respiratory:                sob and wheezing  Cardiovascular:           No chest pain, palpitations  Gastrointestinal:        No abdominal or epigastric pain. No N/V/diarrhea or hematemesis  Genitourinary:            No dysuria, frequency, hematuria or incontinence  SKIN:                             no rash  Musculoskeletal:        No joint pain or swelling  Extremities:                No swelling  Neurological:              No headaches  Psychiatric:                 No depression, anxiety    MACRA & MIPS.  Vaccines - Influenza: yes and Pneumovax: yes  Tobacco:  no  Blood Pressure Screening / Control of: 192/126  Current Medications Reviewed:    Vital Signs Last 24 Hrs  T(C): 37.1 (31 Mar 2019 20:23), Max: 37.4 (31 Mar 2019 14:55)  T(F): 98.8 (31 Mar 2019 20:23), Max: 99.3 (31 Mar 2019 14:55)  HR: 95 (31 Mar 2019 20:23) (95 - 113)  BP: 133/78 (31 Mar 2019 20:23) (133/78 - 192/126)  BP(mean): --  RR: 21 (31 Mar 2019 20:23) (20 - 21)  SpO2: 95% (31 Mar 2019 20:23) (95% - 96%)    PHYSICAL EXAM:  GEN:         Awake, responsive and comfortable.  HEENT:    Normal.    RESP:        wheezing.  CVS:          Regular rate and rhythm.   ABD:         Soft, non-tender, non-distended;   :             No costovertebral angle tenderness  SKIN:           Warm and dry.  EXTR:            No clubbing, cyanosis or edema  CNS:              Intact sensory and motor function.  PSYCH:        cooperative, no anxiety or depression    LABS:                        14.6   12.76 )-----------( 222      ( 31 Mar 2019 16:16 )             45.0     03-31    141  |  107  |  7   ----------------------------<  98  4.0   |  26  |  0.90    Ca    9.2      31 Mar 2019 16:16  Mg     2.1     03-31    TPro  7.8  /  Alb  3.5  /  TBili  0.8  /  DBili  x   /  AST  24  /  ALT  13  /  AlkPhos  128<H>  03-31    PT/INR - ( 31 Mar 2019 17:53 )   PT: 12.1 sec;   INR: 1.08 ratio      PTT - ( 31 Mar 2019 17:53 )  PTT:29.1 sec  03-31 @ 18:19  pH: 7.45  pCO2: 37  pO2: 63  SaO2: 92    EKG: sinus rhythm    RADIOLOGY & ADDITIONAL STUDIES:  < from: Xray Chest 1 View AP/PA. (03.31.19 @ 16:53) >    EXAM:  XR CHEST AP OR PA 1V                          PROCEDURE DATE:  03/31/2019      INTERPRETATION:  cough    A frontal chest film  demonstrates grossly clear lungs.  No acute   infiltrate or consolidation is  noted. The costophrenic anglesare   grossly clear.    The heart size and vascular markings are within normal limits for   technique.      No mediastinal or hilar adenopathy is suggested. .     The osseous structures appear intact intact.     IMPRESSION:  Clear  lungs.  No acute airspace disease suggested.    KAILASH BLACK M.D., ATTENDING RADIOLOGIST  This document has been electronically signed. Mar 31 2019  5:09PM      ASSESSMENT AND PLAN:  ·	SOB with Wheezing.  ·	Moderate persistent Asthma with acute exacerbation.  ·	Hypertensive Urgency.  ·	Leukocytosis.  ·	Breast CA S/P Bilateral Mastectomy.  ·	HTN.  ·	HLD.  ·	Anxiety.    Start on nasal O2, nebulizer and short course of steroids.  BP control.  SPO2 on room air before discharge for O2 need.  DVT prophylaxis.

## 2019-03-31 NOTE — ED PROVIDER NOTE - CLINICAL SUMMARY MEDICAL DECISION MAKING FREE TEXT BOX
pt comfortable, full sentences, no distress, and speaking full sentences but still with diffuse wheeze and sta 88%. will do ABG. Pt refuse CT. d/w dr christensen for admission.

## 2019-03-31 NOTE — H&P ADULT - NSHPLABSRESULTS_GEN_ALL_CORE
LABS:                        14.6   12.76 )-----------( 222      ( 31 Mar 2019 16:16 )             45.0     03-31    141  |  107  |  7   ----------------------------<  98  4.0   |  26  |  0.90    Ca    9.2      31 Mar 2019 16:16  Mg     2.1     03-31    TPro  7.8  /  Alb  3.5  /  TBili  0.8  /  DBili  x   /  AST  24  /  ALT  13  /  AlkPhos  128<H>  03-31    PT/INR - ( 31 Mar 2019 17:53 )   PT: 12.1 sec;   INR: 1.08 ratio         PTT - ( 31 Mar 2019 17:53 )  PTT:29.1 sec    CAPILLARY BLOOD GLUCOSE        CARDIAC MARKERS ( 31 Mar 2019 16:16 )  <.015 ng/mL / x     / x     / x     / x          < from: Xray Chest 1 View AP/PA. (03.31.19 @ 16:53) >      EXAM:  XR CHEST AP OR PA 1V                            PROCEDURE DATE:  03/31/2019          INTERPRETATION:  cough    A frontal chest film  demonstrates grossly clear lungs.  No acute   infiltrate or consolidation is  noted. The costophrenic anglesare   grossly clear.    The heart size and vascular markings are within normal limits for   technique.      No mediastinal or hilar adenopathy is suggested. .     The osseous structures appear intact intact.     IMPRESSION:  Clear  lungs.  No acute airspace disease suggested.      < end of copied text >

## 2019-03-31 NOTE — H&P ADULT - HISTORY OF PRESENT ILLNESS
67yo, BF,  with pmh asthma, breast ca s/p bl mastectomy, htn, presents with increasing SOB for 1 week, worse today. Associated with wheezing and cough.   Denies CP, palpitation, n/v/d, fever, chills, weakness, abdominal pain ,dysuria.   In ER, O2 sat 88%.

## 2019-04-01 LAB
ANION GAP SERPL CALC-SCNC: 10 MMOL/L — SIGNIFICANT CHANGE UP (ref 5–17)
BASOPHILS # BLD AUTO: 0.01 K/UL — SIGNIFICANT CHANGE UP (ref 0–0.2)
BASOPHILS NFR BLD AUTO: 0.1 % — SIGNIFICANT CHANGE UP (ref 0–2)
BUN SERPL-MCNC: 10 MG/DL — SIGNIFICANT CHANGE UP (ref 7–23)
CALCIUM SERPL-MCNC: 9.2 MG/DL — SIGNIFICANT CHANGE UP (ref 8.5–10.1)
CHLORIDE SERPL-SCNC: 106 MMOL/L — SIGNIFICANT CHANGE UP (ref 96–108)
CO2 SERPL-SCNC: 24 MMOL/L — SIGNIFICANT CHANGE UP (ref 22–31)
CREAT SERPL-MCNC: 0.8 MG/DL — SIGNIFICANT CHANGE UP (ref 0.5–1.3)
EOSINOPHIL # BLD AUTO: 0 K/UL — SIGNIFICANT CHANGE UP (ref 0–0.5)
EOSINOPHIL NFR BLD AUTO: 0 % — SIGNIFICANT CHANGE UP (ref 0–6)
GLUCOSE SERPL-MCNC: 169 MG/DL — HIGH (ref 70–99)
HCT VFR BLD CALC: 42.1 % — SIGNIFICANT CHANGE UP (ref 34.5–45)
HCV AB S/CO SERPL IA: 0.24 S/CO — SIGNIFICANT CHANGE UP (ref 0–0.79)
HCV AB SERPL-IMP: SIGNIFICANT CHANGE UP
HGB BLD-MCNC: 13.7 G/DL — SIGNIFICANT CHANGE UP (ref 11.5–15.5)
IMM GRANULOCYTES NFR BLD AUTO: 0.2 % — SIGNIFICANT CHANGE UP (ref 0–1.5)
LYMPHOCYTES # BLD AUTO: 1.06 K/UL — SIGNIFICANT CHANGE UP (ref 1–3.3)
LYMPHOCYTES # BLD AUTO: 11.4 % — LOW (ref 13–44)
MCHC RBC-ENTMCNC: 28.8 PG — SIGNIFICANT CHANGE UP (ref 27–34)
MCHC RBC-ENTMCNC: 32.5 GM/DL — SIGNIFICANT CHANGE UP (ref 32–36)
MCV RBC AUTO: 88.4 FL — SIGNIFICANT CHANGE UP (ref 80–100)
MONOCYTES # BLD AUTO: 0.12 K/UL — SIGNIFICANT CHANGE UP (ref 0–0.9)
MONOCYTES NFR BLD AUTO: 1.3 % — LOW (ref 2–14)
NEUTROPHILS # BLD AUTO: 8.05 K/UL — HIGH (ref 1.8–7.4)
NEUTROPHILS NFR BLD AUTO: 87 % — HIGH (ref 43–77)
NRBC # BLD: 0 /100 WBCS — SIGNIFICANT CHANGE UP (ref 0–0)
PLATELET # BLD AUTO: 253 K/UL — SIGNIFICANT CHANGE UP (ref 150–400)
POTASSIUM SERPL-MCNC: 3.5 MMOL/L — SIGNIFICANT CHANGE UP (ref 3.5–5.3)
POTASSIUM SERPL-SCNC: 3.5 MMOL/L — SIGNIFICANT CHANGE UP (ref 3.5–5.3)
RBC # BLD: 4.76 M/UL — SIGNIFICANT CHANGE UP (ref 3.8–5.2)
RBC # FLD: 12.2 % — SIGNIFICANT CHANGE UP (ref 10.3–14.5)
SODIUM SERPL-SCNC: 140 MMOL/L — SIGNIFICANT CHANGE UP (ref 135–145)
WBC # BLD: 9.26 K/UL — SIGNIFICANT CHANGE UP (ref 3.8–10.5)
WBC # FLD AUTO: 9.26 K/UL — SIGNIFICANT CHANGE UP (ref 3.8–10.5)

## 2019-04-01 PROCEDURE — 36000 PLACE NEEDLE IN VEIN: CPT

## 2019-04-01 PROCEDURE — 76937 US GUIDE VASCULAR ACCESS: CPT | Mod: 26

## 2019-04-01 RX ADMIN — Medication 100 MILLIGRAM(S): at 23:02

## 2019-04-01 RX ADMIN — Medication 3 MILLILITER(S): at 17:11

## 2019-04-01 RX ADMIN — Medication 40 MILLIGRAM(S): at 05:00

## 2019-04-01 RX ADMIN — ENOXAPARIN SODIUM 40 MILLIGRAM(S): 100 INJECTION SUBCUTANEOUS at 23:03

## 2019-04-01 RX ADMIN — MONTELUKAST 10 MILLIGRAM(S): 4 TABLET, CHEWABLE ORAL at 11:09

## 2019-04-01 RX ADMIN — Medication 3 MILLILITER(S): at 05:36

## 2019-04-01 RX ADMIN — Medication 3 MILLILITER(S): at 23:04

## 2019-04-01 RX ADMIN — Medication 1 TABLET(S): at 11:09

## 2019-04-01 RX ADMIN — Medication 40 MILLIGRAM(S): at 23:02

## 2019-04-01 RX ADMIN — Medication 3 MILLILITER(S): at 11:09

## 2019-04-01 RX ADMIN — AMLODIPINE BESYLATE 5 MILLIGRAM(S): 2.5 TABLET ORAL at 05:01

## 2019-04-01 RX ADMIN — SODIUM CHLORIDE 70 MILLILITER(S): 9 INJECTION, SOLUTION INTRAVENOUS at 20:23

## 2019-04-01 NOTE — PROCEDURE NOTE - ADDITIONAL PROCEDURE DETAILS
Medicine Hospitalist PA    Requested by RN to place an IV heplock in patient. As per RN pt is a difficult stick. Placed IV heplock in left forearm. #22G inserted with ultrasound guidance. IV flushed and secured. RN aware.

## 2019-04-02 LAB
HCT VFR BLD CALC: 40.3 % — SIGNIFICANT CHANGE UP (ref 34.5–45)
HGB BLD-MCNC: 13 G/DL — SIGNIFICANT CHANGE UP (ref 11.5–15.5)
MCHC RBC-ENTMCNC: 28.8 PG — SIGNIFICANT CHANGE UP (ref 27–34)
MCHC RBC-ENTMCNC: 32.3 GM/DL — SIGNIFICANT CHANGE UP (ref 32–36)
MCV RBC AUTO: 89.4 FL — SIGNIFICANT CHANGE UP (ref 80–100)
NRBC # BLD: 0 /100 WBCS — SIGNIFICANT CHANGE UP (ref 0–0)
PLATELET # BLD AUTO: 248 K/UL — SIGNIFICANT CHANGE UP (ref 150–400)
RBC # BLD: 4.51 M/UL — SIGNIFICANT CHANGE UP (ref 3.8–5.2)
RBC # FLD: 12.5 % — SIGNIFICANT CHANGE UP (ref 10.3–14.5)
WBC # BLD: 12.24 K/UL — HIGH (ref 3.8–10.5)
WBC # FLD AUTO: 12.24 K/UL — HIGH (ref 3.8–10.5)

## 2019-04-02 RX ORDER — AMLODIPINE BESYLATE 2.5 MG/1
10 TABLET ORAL DAILY
Qty: 0 | Refills: 0 | Status: DISCONTINUED | OUTPATIENT
Start: 2019-04-02 | End: 2019-04-04

## 2019-04-02 RX ORDER — POLYETHYLENE GLYCOL 3350 17 G/17G
17 POWDER, FOR SOLUTION ORAL DAILY
Qty: 0 | Refills: 0 | Status: DISCONTINUED | OUTPATIENT
Start: 2019-04-02 | End: 2019-04-04

## 2019-04-02 RX ORDER — BUDESONIDE AND FORMOTEROL FUMARATE DIHYDRATE 160; 4.5 UG/1; UG/1
2 AEROSOL RESPIRATORY (INHALATION)
Qty: 0 | Refills: 0 | Status: DISCONTINUED | OUTPATIENT
Start: 2019-04-02 | End: 2019-04-04

## 2019-04-02 RX ADMIN — Medication 3 MILLILITER(S): at 11:55

## 2019-04-02 RX ADMIN — Medication 100 MILLIGRAM(S): at 13:53

## 2019-04-02 RX ADMIN — Medication 40 MILLIGRAM(S): at 21:30

## 2019-04-02 RX ADMIN — Medication 3 MILLILITER(S): at 17:49

## 2019-04-02 RX ADMIN — Medication 3 MILLILITER(S): at 23:49

## 2019-04-02 RX ADMIN — MONTELUKAST 10 MILLIGRAM(S): 4 TABLET, CHEWABLE ORAL at 12:15

## 2019-04-02 RX ADMIN — Medication 3 MILLILITER(S): at 05:18

## 2019-04-02 RX ADMIN — Medication 40 MILLIGRAM(S): at 13:54

## 2019-04-02 RX ADMIN — Medication 1 TABLET(S): at 12:15

## 2019-04-02 RX ADMIN — AMLODIPINE BESYLATE 5 MILLIGRAM(S): 2.5 TABLET ORAL at 05:38

## 2019-04-02 RX ADMIN — Medication 100 MILLIGRAM(S): at 05:38

## 2019-04-02 RX ADMIN — Medication 40 MILLIGRAM(S): at 05:38

## 2019-04-02 RX ADMIN — PANTOPRAZOLE SODIUM 40 MILLIGRAM(S): 20 TABLET, DELAYED RELEASE ORAL at 08:14

## 2019-04-03 LAB
ALBUMIN SERPL ELPH-MCNC: 2.9 G/DL — LOW (ref 3.3–5)
ALP SERPL-CCNC: 101 U/L — SIGNIFICANT CHANGE UP (ref 40–120)
ALT FLD-CCNC: 13 U/L — SIGNIFICANT CHANGE UP (ref 12–78)
ANION GAP SERPL CALC-SCNC: 9 MMOL/L — SIGNIFICANT CHANGE UP (ref 5–17)
AST SERPL-CCNC: 13 U/L — LOW (ref 15–37)
BILIRUB SERPL-MCNC: 0.5 MG/DL — SIGNIFICANT CHANGE UP (ref 0.2–1.2)
BUN SERPL-MCNC: 14 MG/DL — SIGNIFICANT CHANGE UP (ref 7–23)
CALCIUM SERPL-MCNC: 9 MG/DL — SIGNIFICANT CHANGE UP (ref 8.5–10.1)
CHLORIDE SERPL-SCNC: 109 MMOL/L — HIGH (ref 96–108)
CO2 SERPL-SCNC: 24 MMOL/L — SIGNIFICANT CHANGE UP (ref 22–31)
CREAT SERPL-MCNC: 0.97 MG/DL — SIGNIFICANT CHANGE UP (ref 0.5–1.3)
GLUCOSE SERPL-MCNC: 167 MG/DL — HIGH (ref 70–99)
HCT VFR BLD CALC: 40.2 % — SIGNIFICANT CHANGE UP (ref 34.5–45)
HGB BLD-MCNC: 13 G/DL — SIGNIFICANT CHANGE UP (ref 11.5–15.5)
MCHC RBC-ENTMCNC: 29.2 PG — SIGNIFICANT CHANGE UP (ref 27–34)
MCHC RBC-ENTMCNC: 32.3 GM/DL — SIGNIFICANT CHANGE UP (ref 32–36)
MCV RBC AUTO: 90.3 FL — SIGNIFICANT CHANGE UP (ref 80–100)
NRBC # BLD: 0 /100 WBCS — SIGNIFICANT CHANGE UP (ref 0–0)
PLATELET # BLD AUTO: 172 K/UL — SIGNIFICANT CHANGE UP (ref 150–400)
POTASSIUM SERPL-MCNC: 4 MMOL/L — SIGNIFICANT CHANGE UP (ref 3.5–5.3)
POTASSIUM SERPL-SCNC: 4 MMOL/L — SIGNIFICANT CHANGE UP (ref 3.5–5.3)
PROT SERPL-MCNC: 6.5 GM/DL — SIGNIFICANT CHANGE UP (ref 6–8.3)
RBC # BLD: 4.45 M/UL — SIGNIFICANT CHANGE UP (ref 3.8–5.2)
RBC # FLD: 12.6 % — SIGNIFICANT CHANGE UP (ref 10.3–14.5)
SODIUM SERPL-SCNC: 142 MMOL/L — SIGNIFICANT CHANGE UP (ref 135–145)
WBC # BLD: 10.16 K/UL — SIGNIFICANT CHANGE UP (ref 3.8–10.5)
WBC # FLD AUTO: 10.16 K/UL — SIGNIFICANT CHANGE UP (ref 3.8–10.5)

## 2019-04-03 RX ADMIN — BUDESONIDE AND FORMOTEROL FUMARATE DIHYDRATE 2 PUFF(S): 160; 4.5 AEROSOL RESPIRATORY (INHALATION) at 05:29

## 2019-04-03 RX ADMIN — Medication 3 MILLILITER(S): at 17:06

## 2019-04-03 RX ADMIN — Medication 3 MILLILITER(S): at 11:16

## 2019-04-03 RX ADMIN — Medication 100 MILLIGRAM(S): at 22:27

## 2019-04-03 RX ADMIN — BUDESONIDE AND FORMOTEROL FUMARATE DIHYDRATE 2 PUFF(S): 160; 4.5 AEROSOL RESPIRATORY (INHALATION) at 18:35

## 2019-04-03 RX ADMIN — Medication 3 MILLILITER(S): at 23:54

## 2019-04-03 RX ADMIN — Medication 40 MILLIGRAM(S): at 14:02

## 2019-04-03 RX ADMIN — Medication 1 TABLET(S): at 11:07

## 2019-04-03 RX ADMIN — ENOXAPARIN SODIUM 40 MILLIGRAM(S): 100 INJECTION SUBCUTANEOUS at 22:27

## 2019-04-03 RX ADMIN — MONTELUKAST 10 MILLIGRAM(S): 4 TABLET, CHEWABLE ORAL at 11:07

## 2019-04-03 RX ADMIN — PANTOPRAZOLE SODIUM 40 MILLIGRAM(S): 20 TABLET, DELAYED RELEASE ORAL at 08:26

## 2019-04-03 RX ADMIN — Medication 40 MILLIGRAM(S): at 05:19

## 2019-04-03 RX ADMIN — Medication 100 MILLIGRAM(S): at 14:01

## 2019-04-03 RX ADMIN — Medication 3 MILLILITER(S): at 05:55

## 2019-04-03 RX ADMIN — AMLODIPINE BESYLATE 10 MILLIGRAM(S): 2.5 TABLET ORAL at 05:19

## 2019-04-03 NOTE — PROGRESS NOTE ADULT - ATTENDING COMMENTS
Pulmonary following;  Will continue steroids, Singular and nebulizer.  Add Symbicort.  On empiric antibiotics.  SPO2 on room air before discharge for O2 need.    PT  Continue current care and treatment.

## 2019-04-04 ENCOUNTER — TRANSCRIPTION ENCOUNTER (OUTPATIENT)
Age: 69
End: 2019-04-04

## 2019-04-04 VITALS — OXYGEN SATURATION: 98 %

## 2019-04-04 LAB
ANION GAP SERPL CALC-SCNC: 9 MMOL/L — SIGNIFICANT CHANGE UP (ref 5–17)
BUN SERPL-MCNC: 17 MG/DL — SIGNIFICANT CHANGE UP (ref 7–23)
CALCIUM SERPL-MCNC: 8.9 MG/DL — SIGNIFICANT CHANGE UP (ref 8.5–10.1)
CHLORIDE SERPL-SCNC: 107 MMOL/L — SIGNIFICANT CHANGE UP (ref 96–108)
CO2 SERPL-SCNC: 26 MMOL/L — SIGNIFICANT CHANGE UP (ref 22–31)
CREAT SERPL-MCNC: 0.96 MG/DL — SIGNIFICANT CHANGE UP (ref 0.5–1.3)
GLUCOSE SERPL-MCNC: 106 MG/DL — HIGH (ref 70–99)
HCT VFR BLD CALC: 39.7 % — SIGNIFICANT CHANGE UP (ref 34.5–45)
HGB BLD-MCNC: 12.9 G/DL — SIGNIFICANT CHANGE UP (ref 11.5–15.5)
MCHC RBC-ENTMCNC: 29.5 PG — SIGNIFICANT CHANGE UP (ref 27–34)
MCHC RBC-ENTMCNC: 32.5 GM/DL — SIGNIFICANT CHANGE UP (ref 32–36)
MCV RBC AUTO: 90.6 FL — SIGNIFICANT CHANGE UP (ref 80–100)
NRBC # BLD: 0 /100 WBCS — SIGNIFICANT CHANGE UP (ref 0–0)
PLATELET # BLD AUTO: 264 K/UL — SIGNIFICANT CHANGE UP (ref 150–400)
POTASSIUM SERPL-MCNC: 3.7 MMOL/L — SIGNIFICANT CHANGE UP (ref 3.5–5.3)
POTASSIUM SERPL-SCNC: 3.7 MMOL/L — SIGNIFICANT CHANGE UP (ref 3.5–5.3)
RBC # BLD: 4.38 M/UL — SIGNIFICANT CHANGE UP (ref 3.8–5.2)
RBC # FLD: 12.5 % — SIGNIFICANT CHANGE UP (ref 10.3–14.5)
SODIUM SERPL-SCNC: 142 MMOL/L — SIGNIFICANT CHANGE UP (ref 135–145)
WBC # BLD: 9.11 K/UL — SIGNIFICANT CHANGE UP (ref 3.8–10.5)
WBC # FLD AUTO: 9.11 K/UL — SIGNIFICANT CHANGE UP (ref 3.8–10.5)

## 2019-04-04 RX ORDER — ALBUTEROL 90 UG/1
2 AEROSOL, METERED ORAL
Qty: 180 | Refills: 0 | OUTPATIENT
Start: 2019-04-04 | End: 2019-05-03

## 2019-04-04 RX ORDER — LEVOFLOXACIN 5 MG/ML
1 INJECTION, SOLUTION INTRAVENOUS
Qty: 5 | Refills: 0
Start: 2019-04-04 | End: 2019-04-08

## 2019-04-04 RX ORDER — PANTOPRAZOLE SODIUM 20 MG/1
1 TABLET, DELAYED RELEASE ORAL
Qty: 30 | Refills: 0 | OUTPATIENT
Start: 2019-04-04 | End: 2019-05-03

## 2019-04-04 RX ADMIN — PANTOPRAZOLE SODIUM 40 MILLIGRAM(S): 20 TABLET, DELAYED RELEASE ORAL at 05:31

## 2019-04-04 RX ADMIN — Medication 3 MILLILITER(S): at 11:43

## 2019-04-04 RX ADMIN — Medication 20 MILLIGRAM(S): at 17:57

## 2019-04-04 RX ADMIN — MONTELUKAST 10 MILLIGRAM(S): 4 TABLET, CHEWABLE ORAL at 12:51

## 2019-04-04 RX ADMIN — Medication 3 MILLILITER(S): at 05:49

## 2019-04-04 RX ADMIN — Medication 20 MILLIGRAM(S): at 05:31

## 2019-04-04 RX ADMIN — Medication 1 TABLET(S): at 12:51

## 2019-04-04 RX ADMIN — BUDESONIDE AND FORMOTEROL FUMARATE DIHYDRATE 2 PUFF(S): 160; 4.5 AEROSOL RESPIRATORY (INHALATION) at 17:56

## 2019-04-04 RX ADMIN — AMLODIPINE BESYLATE 10 MILLIGRAM(S): 2.5 TABLET ORAL at 05:31

## 2019-04-04 RX ADMIN — Medication 3 MILLILITER(S): at 17:40

## 2019-04-04 RX ADMIN — Medication 100 MILLIGRAM(S): at 14:16

## 2019-04-04 RX ADMIN — Medication 100 MILLIGRAM(S): at 05:31

## 2019-04-04 RX ADMIN — BUDESONIDE AND FORMOTEROL FUMARATE DIHYDRATE 2 PUFF(S): 160; 4.5 AEROSOL RESPIRATORY (INHALATION) at 05:31

## 2019-04-04 NOTE — DISCHARGE NOTE PROVIDER - NSDCCPCAREPLAN_GEN_ALL_CORE_FT
PRINCIPAL DISCHARGE DIAGNOSIS  Diagnosis: Moderate persistent asthma with exacerbation  Assessment and Plan of Treatment: Resolved  Avoid future exacerbations  Continue meds as prescribed  Follow up with pulmonologist      SECONDARY DISCHARGE DIAGNOSES  Diagnosis: History of bilateral mastectomy  Assessment and Plan of Treatment: Follow up with gynecologist    Diagnosis: Essential hypertension  Assessment and Plan of Treatment: Continue medications as prescribed  Follow up with PMD  Low-sodium diet  AHA Recipes - [email or web address]

## 2019-04-04 NOTE — PHYSICAL THERAPY INITIAL EVALUATION ADULT - PERTINENT HX OF CURRENT PROBLEM, REHAB EVAL
Patient is a 69 y/o female admitted to [facility] due to SOB, Asthma exacerbation. Chest X-ray showed negative acute findings.

## 2019-04-04 NOTE — DISCHARGE NOTE PROVIDER - HOSPITAL COURSE
69yo, BF,  with pmh asthma, breast ca s/p bl mastectomy, htn, presents with increasing SOB for 1 week, worse today. Associated with wheezing and cough. Wheezing resolved, ambulating well without oxygen. Patient symptoms controlled, medically optimized, and stable for discharge. Patient to follow up with PMD, pulmonologist.

## 2019-04-04 NOTE — DISCHARGE NOTE PROVIDER - PROVIDER TOKENS
PROVIDER:[TOKEN:[[id number]],FOLLOWUP:[1 week]],PROVIDER:[TOKEN:[[id number]],FOLLOWUP:[1 week]],PROVIDER:[TOKEN:[[id number]],FOLLOWUP:[2 weeks]]

## 2019-04-04 NOTE — PROGRESS NOTE ADULT - REASON FOR ADMISSION
Difficulty breathing

## 2019-04-04 NOTE — DISCHARGE NOTE NURSING/CASE MANAGEMENT/SOCIAL WORK - NSDCDPATPORTLINK_GEN_ALL_CORE
You can access the [email or web address] Portal, offered by [facility], by registering with the following website: [email or web address]

## 2019-04-04 NOTE — PROGRESS NOTE ADULT - SUBJECTIVE AND OBJECTIVE BOX
INTERVAL HPI:   68 year female with HTN, HLD, Asthma for last 10 -12 years, Breast CA S/P bilateral Mastectomy and Anxiety.  Came with one day of sob and wheezing, had feeling as  her throat was closing up on her. Denies any high fever, chills, cough or sputum production.  Denies any recent URI. Home meds include Breo Ellipta, Albuterol and as needed prednisone.   Denies tobacco abuse.    OVERNIGHT EVENTS:  Comfortable.    Vital Signs Last 24 Hrs  T(C): 36.6 (02 Apr 2019 17:16), Max: 36.7 (01 Apr 2019 23:30)  T(F): 97.9 (02 Apr 2019 17:16), Max: 98.1 (01 Apr 2019 23:30)  HR: 85 (02 Apr 2019 18:09) (71 - 88)  BP: 136/74 (02 Apr 2019 17:16) (118/69 - 148/82)  BP(mean): --  RR: 15 (02 Apr 2019 17:16) (14 - 18)  SpO2: 92% (02 Apr 2019 18:09) (92% - 97%)    PHYSICAL EXAM:  GEN:         comfortable.  HEENT:    Normal.    RESP:        no distress  CVS:             Regular rate and rhythm.   ABD:         Soft, non-tender, non-distended;     MEDICATIONS  (STANDING):  ALBUTerol    90 MICROgram(s) HFA Inhaler 1 Puff(s) Inhalation every 4 hours  ALBUTerol/ipratropium for Nebulization 3 milliLiter(s) Nebulizer every 6 hours  amLODIPine   Tablet 10 milliGRAM(s) Oral daily  docusate sodium 100 milliGRAM(s) Oral three times a day  enoxaparin Injectable 40 milliGRAM(s) SubCutaneous every 24 hours  levoFLOXacin IVPB      levoFLOXacin IVPB 500 milliGRAM(s) IV Intermittent every 24 hours  methylPREDNISolone sodium succinate Injectable 40 milliGRAM(s) IV Push every 8 hours  montelukast 10 milliGRAM(s) Oral daily  multivitamin 1 Tablet(s) Oral daily  pantoprazole    Tablet 40 milliGRAM(s) Oral before breakfast  tiotropium 18 MICROgram(s) Capsule 1 Capsule(s) Inhalation daily    MEDICATIONS  (PRN):  HYDROcodone/homatropine Syrup 5 milliLiter(s) Oral three times a day PRN Cough  polyethylene glycol 3350 17 Gram(s) Oral daily PRN Constipation    LABS:                        13.0   12.24 )-----------( 248      ( 02 Apr 2019 07:17 )             40.3     04-01    140  |  106  |  10  ----------------------------<  169<H>  3.5   |  24  |  0.80    Ca    9.2      01 Apr 2019 06:37    03-31 @ 18:19  pH: 7.45  pCO2: 37  pO2: 63  SaO2: 92      ASSESSMENT AND PLAN:  ·	SOB with Wheezing.  ·	Moderate persistent Asthma with acute exacerbation.  ·	Hypertensive Urgency.  ·	Leukocytosis.  ·	Breast CA S/P Bilateral Mastectomy.  ·	HTN.  ·	HLD.  ·	Anxiety.    Will continue steroids, Singular and nebulizer.  Add Symbicort.  On empiric antibiotics.  SPO2 on room air before discharge for O2 need.
INTERVAL HPI:  68 year female with HTN, HLD, Asthma for last 10 -12 years, Breast CA S/P bilateral Mastectomy and Anxiety.  Came with one day of sob and wheezing, had feeling as  her throat was closing up on her. Denies any high fever, chills, cough or sputum production.  Denies any recent URI. Home meds include Breo Ellipta, Albuterol and as needed prednisone.   Denies tobacco abuse.    OVERNIGHT EVENTS:  Awake and comfortable.    Vital Signs Last 24 Hrs  T(C): 36.5 (04 Apr 2019 10:53), Max: 37.1 (03 Apr 2019 17:39)  T(F): 97.7 (04 Apr 2019 10:53), Max: 98.7 (03 Apr 2019 17:39)  HR: 70 (04 Apr 2019 11:43) (66 - 90)  BP: 128/64 (04 Apr 2019 10:53) (128/64 - 150/85)  BP(mean): --  RR: 17 (04 Apr 2019 10:53) (17 - 18)  SpO2: 97% (04 Apr 2019 11:43) (93% - 99%)    PHYSICAL EXAM:  GEN:         Awake, responsive and comfortable.  HEENT:    Normal.    RESP:        no wheezing.  CVS:             Regular rate and rhythm.   ABD:         Soft, non-tender, non-distended;     MEDICATIONS  (STANDING):  ALBUTerol    90 MICROgram(s) HFA Inhaler 1 Puff(s) Inhalation every 4 hours  ALBUTerol/ipratropium for Nebulization 3 milliLiter(s) Nebulizer every 6 hours  amLODIPine   Tablet 10 milliGRAM(s) Oral daily  buDESOnide 160 MICROgram(s)/formoterol 4.5 MICROgram(s) Inhaler 2 Puff(s) Inhalation two times a day  docusate sodium 100 milliGRAM(s) Oral three times a day  enoxaparin Injectable 40 milliGRAM(s) SubCutaneous every 24 hours  levoFLOXacin IVPB      levoFLOXacin IVPB 500 milliGRAM(s) IV Intermittent every 24 hours  montelukast 10 milliGRAM(s) Oral daily  multivitamin 1 Tablet(s) Oral daily  pantoprazole    Tablet 40 milliGRAM(s) Oral before breakfast  predniSONE   Tablet 20 milliGRAM(s) Oral every 12 hours  tiotropium 18 MICROgram(s) Capsule 1 Capsule(s) Inhalation daily    MEDICATIONS  (PRN):  HYDROcodone/homatropine Syrup 5 milliLiter(s) Oral three times a day PRN Cough  polyethylene glycol 3350 17 Gram(s) Oral daily PRN Constipation    LABS:                        12.9   9.11  )-----------( 264      ( 04 Apr 2019 07:34 )             39.7     04-04    142  |  107  |  17  ----------------------------<  106<H>  3.7   |  26  |  0.96    Ca    8.9      04 Apr 2019 07:34    TPro  6.5  /  Alb  2.9<L>  /  TBili  0.5  /  DBili  x   /  AST  13<L>  /  ALT  13  /  AlkPhos  101  04-03    03-31 @ 18:19  pH: 7.45  pCO2: 37  pO2: 63  SaO2: 92    ASSESSMENT AND PLAN:  ·	SOB with Wheezing.  ·	Moderate persistent Asthma with acute exacerbation.  ·	Hypertensive Urgency.  ·	Leukocytosis.  ·	Breast CA S/P Bilateral Mastectomy.  ·	HTN.  ·	HLD.  ·	Anxiety.    Pulmonary improving. SPO2 92% with walking on room air.  Continue Singulair, Symbicort and nebulizer.  On empiric antibiotics.  Taper steroids.  Follows with her Pulmonologist.
INTERVAL HPI:  68 year female with HTN, HLD, Asthma for last 10 -12 years, Breast CA S/P bilateral Mastectomy and Anxiety.  Came with one day of sob and wheezing, had feeling as  her throat was closing up on her. Denies any high fever, chills, cough or sputum production.  Denies any recent URI. Home meds include Breo Ellipta, Albuterol and as needed prednisone.   Denies tobacco abuse.    OVERNIGHT EVENTS:  Awake responsive.    Vital Signs Last 24 Hrs  T(C): 37.1 (03 Apr 2019 17:39), Max: 37.1 (03 Apr 2019 17:39)  T(F): 98.7 (03 Apr 2019 17:39), Max: 98.7 (03 Apr 2019 17:39)  HR: 90 (03 Apr 2019 17:39) (73 - 90)  BP: 148/77 (03 Apr 2019 17:39) (106/73 - 148/77)  BP(mean): --  RR: 18 (03 Apr 2019 17:39) (16 - 18)  SpO2: 99% (03 Apr 2019 17:39) (92% - 99%)    PHYSICAL EXAM:  GEN:         Awake, responsive and comfortable.  HEENT:    Normal.    RESP:       wheezing.  CVS:          Regular rate and rhythm.   ABD:         Soft, non-tender, non-distended;     MEDICATIONS  (STANDING):  ALBUTerol    90 MICROgram(s) HFA Inhaler 1 Puff(s) Inhalation every 4 hours  ALBUTerol/ipratropium for Nebulization 3 milliLiter(s) Nebulizer every 6 hours  amLODIPine   Tablet 10 milliGRAM(s) Oral daily  buDESOnide 160 MICROgram(s)/formoterol 4.5 MICROgram(s) Inhaler 2 Puff(s) Inhalation two times a day  docusate sodium 100 milliGRAM(s) Oral three times a day  enoxaparin Injectable 40 milliGRAM(s) SubCutaneous every 24 hours  levoFLOXacin IVPB      levoFLOXacin IVPB 500 milliGRAM(s) IV Intermittent every 24 hours  methylPREDNISolone sodium succinate Injectable 40 milliGRAM(s) IV Push every 8 hours  montelukast 10 milliGRAM(s) Oral daily  multivitamin 1 Tablet(s) Oral daily  pantoprazole    Tablet 40 milliGRAM(s) Oral before breakfast  tiotropium 18 MICROgram(s) Capsule 1 Capsule(s) Inhalation daily    MEDICATIONS  (PRN):  HYDROcodone/homatropine Syrup 5 milliLiter(s) Oral three times a day PRN Cough  polyethylene glycol 3350 17 Gram(s) Oral daily PRN Constipation    LABS:                        13.0   10.16 )-----------( 172      ( 03 Apr 2019 07:34 )             40.2     04-03    142  |  109<H>  |  14  ----------------------------<  167<H>  4.0   |  24  |  0.97    Ca    9.0      03 Apr 2019 07:34    TPro  6.5  /  Alb  2.9<L>  /  TBili  0.5  /  DBili  x   /  AST  13<L>  /  ALT  13  /  AlkPhos  101  04-03 03-31 @ 18:19  pH: 7.45  pCO2: 37  pO2: 63  SaO2: 92    ASSESSMENT AND PLAN:  ·	SOB with Wheezing.  ·	Moderate persistent Asthma with acute exacerbation.  ·	Hypertensive Urgency.  ·	Leukocytosis.  ·	Breast CA S/P Bilateral Mastectomy.  ·	HTN.  ·	HLD.  ·	Anxiety.    Still wheezing.  Asking for lower steroids and for change to PO.  Continue Singulair, Symbicort and nebulizer.  On empiric antibiotics.  SPO2 on room air before discharge for O2 need.
INTERVAL HPI:  68 year female with HTN, HLD, Asthma for last 10 -12 years, Breast CA S/P bilateral Mastectomy and Anxiety.  Came with one day of sob and wheezing, had feeling as  her throat was closing up on her. Denies any high fever, chills, cough or sputum production.  Denies any recent URI. Home meds include Breo Ellipta, Albuterol and as needed prednisone.   Denies tobacco abuse.    OVERNIGHT EVENTS:  Awake, responsive, feels better.    Vital Signs Last 24 Hrs  T(C): 36.6 (01 Apr 2019 17:40), Max: 37.1 (31 Mar 2019 20:23)  T(F): 97.9 (01 Apr 2019 17:40), Max: 98.8 (31 Mar 2019 20:23)  HR: 94 (01 Apr 2019 17:40) (72 - 110)  BP: 140/69 (01 Apr 2019 17:40) (130/74 - 155/73)  BP(mean): --  RR: 17 (01 Apr 2019 17:40) (17 - 21)  SpO2: 94% (01 Apr 2019 17:40) (94% - 96%)    PHYSICAL EXAM:  GEN:         Awake, responsive and comfortable.  HEENT:    Normal.    RESP:        wheezing.  CVS:          Regular rate and rhythm.   ABD:         Soft, non-tender, non-distended;     MEDICATIONS  (STANDING):  ALBUTerol    90 MICROgram(s) HFA Inhaler 1 Puff(s) Inhalation every 4 hours  ALBUTerol/ipratropium for Nebulization 3 milliLiter(s) Nebulizer every 6 hours  amLODIPine   Tablet 5 milliGRAM(s) Oral daily  dextrose 5% + sodium chloride 0.45%. 1000 milliLiter(s) (70 mL/Hr) IV Continuous <Continuous>  docusate sodium 100 milliGRAM(s) Oral three times a day  enoxaparin Injectable 40 milliGRAM(s) SubCutaneous every 24 hours  levoFLOXacin IVPB      levoFLOXacin IVPB 500 milliGRAM(s) IV Intermittent every 24 hours  methylPREDNISolone sodium succinate Injectable 40 milliGRAM(s) IV Push every 8 hours  montelukast 10 milliGRAM(s) Oral daily  multivitamin 1 Tablet(s) Oral daily  pantoprazole    Tablet 40 milliGRAM(s) Oral before breakfast  tiotropium 18 MICROgram(s) Capsule 1 Capsule(s) Inhalation daily    MEDICATIONS  (PRN):  HYDROcodone/homatropine Syrup 5 milliLiter(s) Oral three times a day PRN Cough    LABS:                        13.7   9.26  )-----------( 253      ( 01 Apr 2019 06:37 )             42.1     04-01    140  |  106  |  10  ----------------------------<  169<H>  3.5   |  24  |  0.80    Ca    9.2      01 Apr 2019 06:37  Mg     2.1     03-31    TPro  7.8  /  Alb  3.5  /  TBili  0.8  /  DBili  x   /  AST  24  /  ALT  13  /  AlkPhos  128<H>  03-31    PT/INR - ( 31 Mar 2019 17:53 )   PT: 12.1 sec;   INR: 1.08 ratio      PTT - ( 31 Mar 2019 17:53 )  PTT:29.1 sec  03-31 @ 18:19  pH: 7.45  pCO2: 37  pO2: 63  SaO2: 92  ASSESSMENT AND PLAN:  ·	SOB with Wheezing.  ·	Moderate persistent Asthma with acute exacerbation.  ·	Hypertensive Urgency.  ·	Leukocytosis.  ·	Breast CA S/P Bilateral Mastectomy.  ·	HTN.  ·	HLD.  ·	Anxiety.    Still wheezing on exam.  Will continue steroids, Singular and nebulizer.  Add Symbicort.  On empiric antibiotics.  SPO2 on room air before discharge for O2 need.
Patient is a 68y old  Female who presents with a chief complaint of Difficulty breathing (02 Apr 2019 20:27)      SUBJECTIVE/OBJECTIVE:    Without complaints. Patient resting comfortably.     MEDICATIONS  (STANDING):  ALBUTerol    90 MICROgram(s) HFA Inhaler 1 Puff(s) Inhalation every 4 hours  ALBUTerol/ipratropium for Nebulization 3 milliLiter(s) Nebulizer every 6 hours  amLODIPine   Tablet 10 milliGRAM(s) Oral daily  buDESOnide 160 MICROgram(s)/formoterol 4.5 MICROgram(s) Inhaler 2 Puff(s) Inhalation two times a day  docusate sodium 100 milliGRAM(s) Oral three times a day  enoxaparin Injectable 40 milliGRAM(s) SubCutaneous every 24 hours  levoFLOXacin IVPB      levoFLOXacin IVPB 500 milliGRAM(s) IV Intermittent every 24 hours  methylPREDNISolone sodium succinate Injectable 40 milliGRAM(s) IV Push every 8 hours  montelukast 10 milliGRAM(s) Oral daily  multivitamin 1 Tablet(s) Oral daily  pantoprazole    Tablet 40 milliGRAM(s) Oral before breakfast  tiotropium 18 MICROgram(s) Capsule 1 Capsule(s) Inhalation daily    MEDICATIONS  (PRN):  HYDROcodone/homatropine Syrup 5 milliLiter(s) Oral three times a day PRN Cough  polyethylene glycol 3350 17 Gram(s) Oral daily PRN Constipation      Allergies    penicillin (Swelling)  Robitussin (Pruritus)    Intolerances          Vital Signs Last 24 Hrs  T(C): 36.8 (03 Apr 2019 05:00), Max: 36.8 (03 Apr 2019 05:00)  T(F): 98.3 (03 Apr 2019 05:00), Max: 98.3 (03 Apr 2019 05:00)  HR: 78 (03 Apr 2019 06:45) (71 - 88)  BP: 121/68 (03 Apr 2019 05:00) (118/69 - 141/65)  BP(mean): --  RR: 16 (03 Apr 2019 05:00) (14 - 16)  SpO2: 96% (03 Apr 2019 06:45) (92% - 97%)    PHYSICAL EXAM:  GENERAL: NAD, well-groomed, well-developed  HEAD:  Atraumatic, Normocephalic  EYES: EOMI, PERRLA, conjunctiva and sclera clear  ENMT: No tonsillar erythema, exudates, or enlargement; Moist mucous membranes, No lesions  NECK: Supple, No JVD, Normal thyroid  NERVOUS SYSTEM:  Alert & Oriented X3; Motor Strength 5/5 B/L upper and lower extremities; DTRs 2+ intact and symmetric  CHEST/LUNG: Scattered Rhonchi bilaterally; No rales wheezing, or rubs  HEART: Regular rate and rhythm; No murmurs, rubs, or gallops  ABDOMEN: Soft, Nontender, Nondistended; Bowel sounds present  EXTREMITIES:  2+ Peripheral Pulses, No clubbing, cyanosis, or edema  LYMPH: No lymphadenopathy noted  SKIN: No rashes or lesions    LABS:                        13.0   10.16 )-----------( 172      ( 03 Apr 2019 07:34 )             40.2     04-03    142  |  109<H>  |  14  ----------------------------<  167<H>  4.0   |  24  |  0.97    Ca    9.0      03 Apr 2019 07:34    TPro  6.5  /  Alb  2.9<L>  /  TBili  0.5  /  DBili  x   /  AST  13<L>  /  ALT  13  /  AlkPhos  101  04-03        CAPILLARY BLOOD GLUCOSE              RADIOLOGY & ADDITIONAL TESTS:        Consultant(s) Notes Reviewed:  [xxx ] YES  [ ] NO
Patient is a 68y old  Female who presents with a chief complaint of Difficulty breathing (04 Apr 2019 12:01)      SUBJECTIVE/OBJECTIVE:    Without complaints. Patient resting comfortably.     MEDICATIONS  (STANDING):  ALBUTerol    90 MICROgram(s) HFA Inhaler 1 Puff(s) Inhalation every 4 hours  ALBUTerol/ipratropium for Nebulization 3 milliLiter(s) Nebulizer every 6 hours  amLODIPine   Tablet 10 milliGRAM(s) Oral daily  buDESOnide 160 MICROgram(s)/formoterol 4.5 MICROgram(s) Inhaler 2 Puff(s) Inhalation two times a day  docusate sodium 100 milliGRAM(s) Oral three times a day  enoxaparin Injectable 40 milliGRAM(s) SubCutaneous every 24 hours  levoFLOXacin IVPB      levoFLOXacin IVPB 500 milliGRAM(s) IV Intermittent every 24 hours  montelukast 10 milliGRAM(s) Oral daily  multivitamin 1 Tablet(s) Oral daily  pantoprazole    Tablet 40 milliGRAM(s) Oral before breakfast  predniSONE   Tablet 20 milliGRAM(s) Oral every 12 hours  tiotropium 18 MICROgram(s) Capsule 1 Capsule(s) Inhalation daily    MEDICATIONS  (PRN):  HYDROcodone/homatropine Syrup 5 milliLiter(s) Oral three times a day PRN Cough  polyethylene glycol 3350 17 Gram(s) Oral daily PRN Constipation      Allergies    penicillin (Swelling)  Robitussin (Pruritus)    Intolerances          Vital Signs Last 24 Hrs  T(C): 36.5 (04 Apr 2019 10:53), Max: 37.1 (03 Apr 2019 17:39)  T(F): 97.7 (04 Apr 2019 10:53), Max: 98.7 (03 Apr 2019 17:39)  HR: 70 (04 Apr 2019 11:43) (66 - 90)  BP: 128/64 (04 Apr 2019 10:53) (128/64 - 150/85)  BP(mean): --  RR: 17 (04 Apr 2019 10:53) (17 - 18)  SpO2: 97% (04 Apr 2019 11:43) (93% - 99%)    PHYSICAL EXAM:  GENERAL: NAD, well-groomed, well-developed  HEAD:  Atraumatic, Normocephalic  EYES: EOMI, PERRLA, conjunctiva and sclera clear  ENMT: No tonsillar erythema, exudates, or enlargement; Moist mucous membranes, No lesions  NECK: Supple, No JVD, Normal thyroid  NERVOUS SYSTEM:  Alert & Oriented X3; Motor Strength 5/5 B/L upper and lower extremities; DTRs 2+ intact and symmetric  CHEST/LUNG: Scattered Rhonchi bilaterally; No rales wheezing, or rubs  HEART: Regular rate and rhythm; No murmurs, rubs, or gallops  ABDOMEN: Soft, Nontender, Nondistended; Bowel sounds present  EXTREMITIES:  2+ Peripheral Pulses, No clubbing, cyanosis, or edema  LYMPH: No lymphadenopathy noted  SKIN: No rashes or lesions    LABS:                        12.9   9.11  )-----------( 264      ( 04 Apr 2019 07:34 )             39.7     04-04    142  |  107  |  17  ----------------------------<  106<H>  3.7   |  26  |  0.96    Ca    8.9      04 Apr 2019 07:34    TPro  6.5  /  Alb  2.9<L>  /  TBili  0.5  /  DBili  x   /  AST  13<L>  /  ALT  13  /  AlkPhos  101  04-03        CAPILLARY BLOOD GLUCOSE              RADIOLOGY & ADDITIONAL TESTS:        Consultant(s) Notes Reviewed:  [xx ] YES  [ ] NO

## 2019-04-04 NOTE — PHYSICAL THERAPY INITIAL EVALUATION ADULT - ACTIVE RANGE OF MOTION EXAMINATION, REHAB EVAL
dany. upper extremity Active ROM was WNL (within normal limits)/bilateral lower extremity Active ROM was WNL (within normal limits)

## 2019-04-04 NOTE — PHYSICAL THERAPY INITIAL EVALUATION ADULT - IMPAIRMENTS FOUND, PT EVAL
muscle strength/poor safety awareness/aerobic capacity/endurance/posture/gait, locomotion, and balance

## 2019-04-04 NOTE — PHYSICAL THERAPY INITIAL EVALUATION ADULT - ADDITIONAL COMMENTS
Patient lives c daughter in the 3rd floor Coop c elevator. Has HHA 8 hrs/5 days. Required Supervision c most ADL's and ambulation without device but somedays uses a broom on household ambulation.

## 2019-04-04 NOTE — PHYSICAL THERAPY INITIAL EVALUATION ADULT - TINETTI GAIT TEST, REHAB EVAL
Indication of gait -1/1,   Step Length and height -1/2,   Foot Clearance -1/2,   Step Symmetry - 1/1,  Step Continuity -1/1,   Path -1/ 2,   Trunk -1/2,   Walking Time -1/1,   Total Score - 8/12

## 2019-04-04 NOTE — PHYSICAL THERAPY INITIAL EVALUATION ADULT - TINETTI BALANCE TEST, REHAB EVAL
Sitting Balance - 1/1 , Rises From Chair - 2/2, Attempts to rise - 2/2 , Immediate Standing Balance - 2/2,  Standing Balance - 2/2, Nudged -  1/2, Eyes Closed - 1/1,  Turning 360 Deg -  2/2, Sitting down - 2/2, Balance Score - 15/16

## 2019-04-04 NOTE — PROGRESS NOTE ADULT - ATTENDING COMMENTS
Pulmonary following;  Asking for lower steroids and for change to PO.  Continue Singulair, Symbicort and nebulizer.  On empiric antibiotics.  SPO2 on room air before discharge for O2 need.  Discharge planning, housing issues  Continue current care and treatment.

## 2019-04-04 NOTE — PHYSICAL THERAPY INITIAL EVALUATION ADULT - CRITERIA FOR SKILLED THERAPEUTIC INTERVENTIONS
predicted duration of therapy intervention/therapy frequency/rehab potential/impairments found/functional limitations in following categories/risk reduction/prevention

## 2019-04-04 NOTE — PHYSICAL THERAPY INITIAL EVALUATION ADULT - GENERAL OBSERVATIONS, REHAB EVAL
Chart (EMR) reviewed. Received supine c HOB elevated, NAD. Alert. Ox4. Able to follow multistep commands/directions.

## 2019-04-04 NOTE — DISCHARGE NOTE PROVIDER - CARE PROVIDER_API CALL
Ariane Pedraza)  Medicine  [address]  [place] [address]  Phone: [phone]  Fax: [phone]  Follow Up Time: 1 week    Linh Haynes)  Medicine  [address]  [place] [address]  Phone: [phone]  Fax: [phone]  Follow Up Time: 1 week    Danika Monroe)  Obstetrics and Gynecology  [address] [place] [address]  Phone: [phone]  Fax: [phone]  Follow Up Time: 2 weeks

## 2019-04-06 LAB
CULTURE RESULTS: SIGNIFICANT CHANGE UP
SPECIMEN SOURCE: SIGNIFICANT CHANGE UP

## 2019-04-10 DIAGNOSIS — E78.5 HYPERLIPIDEMIA, UNSPECIFIED: ICD-10-CM

## 2019-04-10 DIAGNOSIS — R06.02 SHORTNESS OF BREATH: ICD-10-CM

## 2019-04-10 DIAGNOSIS — Z85.3 PERSONAL HISTORY OF MALIGNANT NEOPLASM OF BREAST: ICD-10-CM

## 2019-04-10 DIAGNOSIS — I16.0 HYPERTENSIVE URGENCY: ICD-10-CM

## 2019-04-10 DIAGNOSIS — Z90.13 ACQUIRED ABSENCE OF BILATERAL BREASTS AND NIPPLES: ICD-10-CM

## 2019-04-10 DIAGNOSIS — Z88.0 ALLERGY STATUS TO PENICILLIN: ICD-10-CM

## 2019-04-10 DIAGNOSIS — J45.41 MODERATE PERSISTENT ASTHMA WITH (ACUTE) EXACERBATION: ICD-10-CM

## 2019-04-10 DIAGNOSIS — J96.01 ACUTE RESPIRATORY FAILURE WITH HYPOXIA: ICD-10-CM

## 2019-04-10 DIAGNOSIS — F41.9 ANXIETY DISORDER, UNSPECIFIED: ICD-10-CM

## 2019-04-10 DIAGNOSIS — I10 ESSENTIAL (PRIMARY) HYPERTENSION: ICD-10-CM

## 2019-12-19 NOTE — PATIENT PROFILE ADULT. - NS PRO MODE OF ARRIVAL
----- Message from Katty Santacruz MA sent at 12/18/2019  5:07 PM CST -----  Contact: SELF  Spoke with patient's daughter and they would like to know where do they go from here(see  note)? Pt will have implants removed but is still having pain under the arm and  did not mention any of this during their office visit. So they would like to be advised of what to do about this!  ----- Message -----  From: Letha Wan  Sent: 12/18/2019   4:48 PM CST  To: Sraina Boles Staff    Type:  Test Results    Who Called:  SELF    Name of Test (Lab/Mammo/Etc):  MRI    Date of Test: 12/03/19    Ordering Provider:  SARINA  Where the test was performed:  [facility]  Would the patient rather a call back or a response via [facility]? CALL     Best Call Back Number:  [phone]        For Clinical Team:Has the provider reviewed the results?    
Patient contacted and ID confirmed by name and   Patient had questions on specific type of surgery and why they could not use fat from other areas on her body. Reviewed Dr. Thomas note concern is for length of surgery and her underlying lchronic lung disease making extubation and icnrease risk of complications prohibitive. She voiced understanding will follow up with plastic surgery clinic to schedule implant removal  
wheelchair

## 2020-02-09 ENCOUNTER — EMERGENCY (EMERGENCY)
Facility: HOSPITAL | Age: 70
LOS: 1 days | Discharge: HOME | End: 2020-02-09
Attending: EMERGENCY MEDICINE | Admitting: EMERGENCY MEDICINE
Payer: MEDICARE

## 2020-02-09 VITALS
SYSTOLIC BLOOD PRESSURE: 128 MMHG | RESPIRATION RATE: 18 BRPM | OXYGEN SATURATION: 100 % | HEART RATE: 82 BPM | DIASTOLIC BLOOD PRESSURE: 75 MMHG

## 2020-02-09 VITALS
HEART RATE: 83 BPM | OXYGEN SATURATION: 100 % | SYSTOLIC BLOOD PRESSURE: 135 MMHG | DIASTOLIC BLOOD PRESSURE: 79 MMHG | TEMPERATURE: 98 F | RESPIRATION RATE: 18 BRPM

## 2020-02-09 PROCEDURE — 99283 EMERGENCY DEPT VISIT LOW MDM: CPT | Mod: 25,GC

## 2020-02-09 PROCEDURE — 93010 ELECTROCARDIOGRAM REPORT: CPT

## 2020-02-09 PROCEDURE — 71046 X-RAY EXAM CHEST 2 VIEWS: CPT | Mod: 26

## 2020-02-09 RX ORDER — IPRATROPIUM/ALBUTEROL SULFATE 18-103MCG
3 AEROSOL WITH ADAPTER (GRAM) INHALATION ONCE
Refills: 0 | Status: COMPLETED | OUTPATIENT
Start: 2020-02-09 | End: 2020-02-09

## 2020-02-09 RX ADMIN — Medication 40 MILLIGRAM(S): at 16:59

## 2020-02-09 RX ADMIN — Medication 3 MILLILITER(S): at 16:44

## 2020-02-09 NOTE — ED ADULT NURSE NOTE - NSFALLRSKASSESSTYPE_ED_ALL_ED
Patient called our office stating schedulers were trying to schedule her MRI for now, but it is not due until 2/2018. I called Anna Smallwood and left message on her confidential voicemail letting her know her MRI is not due until 2/2018. I left our call back number to call if she has any other questions.
Initial (On Arrival)

## 2020-02-09 NOTE — ED PROVIDER NOTE - OBJECTIVE STATEMENT
69y female with PMH of HTN, Breast CA s/p bilateral mastectomy 6yrs ago, asthma presenting with pain radiating from the next to the right arm. Started last night as mild pain. 2-3 hours before presentation pain became severe, also had left hand cramping and left arm pain. When pain was severe she had difficulty breathing, which she states her asthma becomes worse when she has pain, improved with O2 from EMS. No trauma. No numbness, tingling, nausea, vomiting, chest pain, fevers, chills.

## 2020-02-09 NOTE — ED PROVIDER NOTE - NSFOLLOWUPCLINICS_GEN_ALL_ED_FT
[facility]  Orthopedic Surgery  [address], 3rd & [address]th [place] [address]  Phone: [phone]  Fax:   Follow Up Time:     [facility]  Orthopedic Surgery  [address]  [place] [address]  Phone: [phone]  Fax:   Follow Up Time:     [facility]  Orthopedic Surgery  [address] [place] [address]  Phone: [phone]  Fax:   Follow Up Time:     [facility]  Orthopedics  [address] [place] [address]  Phone: [phone]  Fax: [phone]  Follow Up Time:     [facility]  Ortho/Spine  [address] [place] [address]  Phone: [phone]  Fax:   Follow Up Time:

## 2020-02-09 NOTE — ED ADULT TRIAGE NOTE - CHIEF COMPLAINT QUOTE
pt coming by EMS pt was  at [facility] for dany. upper ext. pain right shoulder >L arm pain some finger cramping and SOB.  pt denies CP/dizziness.

## 2020-02-09 NOTE — ED ADULT NURSE NOTE - NS ED NOTE ABUSE SUSPICION NEGLECT YN
----- Message from Shelby Aguila sent at 12/31/2018 10:05 AM CST -----  Patents mother stated the pt was referred to Dr Hopson for a possible reconstructive ear bone surgery, she is on her 3rd set of tubes. I scheduled pt for feb but wanted to verify that Dr Hopson treats this and for peds. Please call mom (Diana Vargas) to discuss condition further, [phone]. Thank you!   No

## 2020-02-09 NOTE — ED PROVIDER NOTE - PROGRESS NOTE DETAILS
Moreno Keane, PGY-2: patients pain is improved, breathing better after neb, no longer has wheezing, will dc.

## 2020-02-09 NOTE — ED ADULT NURSE NOTE - CHIEF COMPLAINT QUOTE
pt coming by EMS pt was  at [facility] for dany. upper ext. pain right shoulder >L arm pain some finger cramping and SOB.  pt denies CP/dizziness.

## 2020-02-09 NOTE — ED PROVIDER NOTE - CLINICAL SUMMARY MEDICAL DECISION MAKING FREE TEXT BOX
69y female with bilateral pain radiating from the neck to the arms bilaterally, worese on the right, reproducible with neck extension and rotation. Concerning for cervical radiculopathy. No numbness or tingling, or other focal deficits, do not think stroke. No longer having difficulty breathing. No chest pain, n/v, pain reproducible, do not think acs. Will get cxr, ekg, give duoneb, and prednisone.

## 2020-02-09 NOTE — ED PROVIDER NOTE - ATTENDING CONTRIBUTION TO CARE
I, Jennifer Cabot, MD, have performed a history and physical exam of the patient and discussed their management with the resident. I reviewed the resident's note and agree with the documented findings and plan of care. My medical decision making and observations are found above.    Cabot: 69F with PMH of HTN, breast CA s/p bilateral mastectomy 6yrs ago, asthma, here with RUE sharp, severe pain, worse with turning her neck to the right.  Also with transient LUE cramping, as well as her baseline wheeze/SOB associated with her asthma.  Denies CP, N/V/diaph, F/C/sputum.  On exam, HDS, NAD, MMM, eyes clear, lungs with mild wheeze, no retractions, normal WOB, heart sounds normal, abd soft, NT, ND, no CVAT, LEs without edema, wwp, skin normal temperature and color, neuro: alert and oriented, PERRLA, EOMIB, symmetric facial movements, 5/5 strength, SILT, normal gait, no perineal numbness, no midline TTP in the cervical region, RUE pain worse when she turns her head to the right, radial pulses 2+ bilat.  Likely cervical radiculopathy.  EKG without STEMI.  Will check CXR to eval for PTX or other lung pathology.

## 2020-02-09 NOTE — ED PROVIDER NOTE - NSFOLLOWUPINSTRUCTIONS_ED_ALL_ED_FT
Your arm pain is likely the result of cervical radiculopathy. Please follow up with a spine specialist, a number has been provided above to schedule an appointment.  For Spine Center Call [phone]    Take all medications as prescribed: Prednisone 40mg once a day for the next four days.    For pain:  Take Ibuprofen 400-600mg every 4-6 hours as needed for pain, do not exceed 3,200mg per day     Take Tylenol 650mg every 4-6 hours as needed for pain, do not exceed 3,250mg per day

## 2020-02-09 NOTE — ED PROVIDER NOTE - PATIENT PORTAL LINK FT
You can access the FollowMyHealth Patient Portal offered by [facility] by registering at the following website: [email or web address]. By joining [employer]’s FollowMyHealth portal, you will also be able to view your health information using other applications (apps) compatible with our system.

## 2020-02-09 NOTE — ED ADULT NURSE NOTE - OBJECTIVE STATEMENT
pt received to 29, aox3.  pt c/o BUE pain and finger cramping since today.  pt is ambulatory to room.  v/s as noted.  MD at bedside, pt denies trauma.  awaiting further orders will monitor

## 2020-07-27 NOTE — PATIENT PROFILE ADULT - BRADEN SCORE
Psychological condition is unchanged.  Continue current treatment regimen.  Psychological condition will be reassessed in 3 months.   17

## 2020-09-09 NOTE — PATIENT PROFILE ADULT. - TEACHING/LEARNING FACTORS INFLUENCE READINESS TO LEARN
Javon Mcmullen is here today for   Chief Complaint   Patient presents with   â¢ Follow-up     follow up- abdominal distension   . Medication Refills needed today? NO,   if you receive a prescription today would you like it to be sent to [facility]? NO      Patient would like communication of their results via:    Patient does not have voice mail or answering service. Health Maintenance Summary     Shingles Vaccine (1 of 2)  Overdue since 3/26/1982    Influenza Vaccine (1)  Due since 9/1/2020    Depression Screening (Yearly)  Next due on 7/29/2021    DTaP/Tdap/Td Vaccine (2 - Td)  Next due on 9/11/2029    Pneumococcal Vaccine 65+   Completed    Hepatitis B Vaccine   Aged Out    Meningococcal Vaccine   Aged Out    HPV Vaccine   Aged Out          Patient is due for the topics as listed above and wishes to proceed with flu vaccine . fatigue

## 2021-06-28 NOTE — PROVIDER CONTACT NOTE (OTHER) - SITUATION
Pt. reports coughing with SOB after ambulating the hallway with RN beside her Principal Discharge DX:	Crying

## 2021-09-25 NOTE — PROCEDURE NOTE - PROCEDURE DATE TIME, MLM
Pt. reviewed discharge instructions and follow up instructions. Pt. verbalizes understanding with no further questions. Pt. ambulatory and not showing any signs of distress.        Unice Galeazzi, RN  09/25/21 [id number] 01-Apr-2019 21:37

## 2022-01-10 ENCOUNTER — EMERGENCY (EMERGENCY)
Facility: HOSPITAL | Age: 72
LOS: 1 days | Discharge: HOME | End: 2022-01-10
Attending: EMERGENCY MEDICINE | Admitting: EMERGENCY MEDICINE
Payer: MEDICARE

## 2022-01-10 VITALS
HEART RATE: 89 BPM | DIASTOLIC BLOOD PRESSURE: 68 MMHG | SYSTOLIC BLOOD PRESSURE: 148 MMHG | OXYGEN SATURATION: 98 % | RESPIRATION RATE: 18 BRPM | TEMPERATURE: 98 F

## 2022-01-10 PROCEDURE — 99283 EMERGENCY DEPT VISIT LOW MDM: CPT

## 2022-01-10 RX ORDER — OXYCODONE HYDROCHLORIDE 5 MG/1
5 TABLET ORAL ONCE
Refills: 0 | Status: DISCONTINUED | OUTPATIENT
Start: 2022-01-10 | End: 2022-01-10

## 2022-01-10 RX ORDER — KETOROLAC TROMETHAMINE 30 MG/ML
30 SYRINGE (ML) INJECTION ONCE
Refills: 0 | Status: DISCONTINUED | OUTPATIENT
Start: 2022-01-10 | End: 2022-01-10

## 2022-01-10 RX ADMIN — OXYCODONE HYDROCHLORIDE 5 MILLIGRAM(S): 5 TABLET ORAL at 13:52

## 2022-01-10 NOTE — ED PROVIDER NOTE - PHYSICAL EXAMINATION
General appearance: NAD, conversant, afebrile    Eyes: anicteric sclerae, GILBERTO, EOMI   HENT: head Atraumatic    Mouth: R upper molar is split. Minimal bleeding from the site. Erythema around the tooth, no significant swelling or drainage   Neck: no lymphadenopathy, Trachea midline; Full range of motion, supple   Pulm: CTA bl, normal respiratory effort and no intercostal retractions, normal work of breathing   CV: RRR, No murmurs, rubs, or gallops. 2+ peripheral pulses.   Abdomen: Soft, non-tender, non-distended; no guarding or rebound   Extremities: No peripheral edema or extremity lymphadenopathy. 5/5 strength in all four extremities.   Skin: Dry, normal temperature, turgor and texture; no rash, ulcers or subcutaneous nodules   Psych: Appropriate affect, cooperative; alert and oriented to person, place and time

## 2022-01-10 NOTE — ED PROVIDER NOTE - ATTENDING CONTRIBUTION TO CARE
Brief HPI:  72 yo F htn, hld, asthma, breast ca s/p mastectomy in remission presenting with R toothache.  Reports having right upper molar pain 6 days ago.  Saw dentist who prescribed clindamycin.  Since then patient reports persistent pain.  Denies facial swelling, fever, chills.      Vitals:   Reviewed    Exam:    GEN:  Non-toxic appearing, non-distressed, speaking full sentences, non-diaphoretic, AAOx3  HEENT:  #3 tooth with fracture, exposed pulp; no gingival swelling or fluctuance; neck supple, EOMI, PERRLA, sclera anicteric, no conjunctival pallor or injection, no stridor, normal voice, no tonsillar exudate, uvula midline  CV:  regular rhythm and rate, s1/s2 audible, no murmurs, rubs or gallops, peripheral pulses 2+ and symmetric  PULM:  non-labored respirations, lungs clear to auscultation bilaterally, no wheezes, crackles or rales  ABD:  non distended, non-tender, no rebound, no guarding, negative Goldberg's sign, bowel sounds normal, no cvat  MSK:  no gross deformity, non-tender extremities and joints, range of motion grossly normal appearing, no extremity edema, extremities warm and well perfused   NEURO:  AAOx3, CN II-XII intact, motor 5/5 in upper and lower extremities bilaterally, sensation grossly intact in extremities and trunk  SKIN:  warm, dry, no rash or vesicles     A/P:  72 yo F htn, hld, asthma, breast ca s/p mastectomy in remission presenting with R toothache.  VSS.  #3 tooth with fracture and exposed pulp.  Patient already on abx.  No e/o dental abscess on exam.  Nerve block offered for analgesia and declined.  Will give oxy given patient already taking NSAID.  dispo pending.

## 2022-01-10 NOTE — ED ADULT TRIAGE NOTE - CHIEF COMPLAINT QUOTE
p/t c/o of dental pain for one week, denies any other discomfort, p/t currently taking antibiotics for this issues

## 2022-01-10 NOTE — ED ADULT TRIAGE NOTE - PATIENT ON (OXYGEN DELIVERY METHOD)
Heart sounds: Normal heart sounds. Comments: No carotid bruit  Pulmonary:      Effort: Pulmonary effort is normal. No respiratory distress. Breath sounds: Normal breath sounds. No wheezing. Abdominal:      General: Bowel sounds are normal.      Palpations: Abdomen is soft. Musculoskeletal:      Right lower leg: No edema. Left lower leg: No edema. Lymphadenopathy:      Cervical: No cervical adenopathy. Skin:     General: Skin is warm. Findings: No rash. Neurological:      Mental Status: She is alert and oriented to person, place, and time. Psychiatric:         Mood and Affect: Affect is angry. Behavior: Behavior normal. Behavior is cooperative. Thought Content: Thought content normal.         Cognition and Memory: Cognition is impaired. Current Outpatient Medications   Medication Sig Dispense Refill    sertraline (ZOLOFT) 25 MG tablet Take 1 tablet by mouth nightly 30 tablet 3    aspirin 81 MG tablet Take 81 mg by mouth daily.  multivitamin (THERAGRAN) per tablet Take 1 tablet by mouth daily.  acetaminophen (TYLENOL) 650 MG suppository Place 650 mg rectally every 4 hours as needed.  Calcium Carbonate (CALTRATE 600 PO) Take  by mouth.  Levothyroxine Sodium (SYNTHROID PO) Take  by mouth.  ALPRAZOLAM PO Take  by mouth.  METOPROLOL TARTRATE by Does not apply route. No current facility-administered medications for this visit.          Allergies   Allergen Reactions    Ibuprofen     Pcn [Penicillins]     Sulfa Antibiotics     Morphine Nausea And Vomiting        Past Medical History:   Diagnosis Date    Diverticulosis     GERD (gastroesophageal reflux disease)     H/O tubal ligation     Hip fracture, right (HCC)     History of resection of large bowel     Hypertension     Palpitation     S/P appy     Thyroid cancer (HCC)         ASSESSMENT:  BP (!) 142/80   Temp 97.6 °F (36.4 °C)   Resp 16       Diagnosis Orders 1. Essential hypertension     2. Anger     3. Medication management          Plan:  Labs and meds reviewed continue current medications  Encouraged to participate in activities.   Encouraged to ambulate as exercise room air

## 2022-01-10 NOTE — ED PROVIDER NOTE - PROGRESS NOTE DETAILS
Nelly Mitchell- arianne would like to leave as she is able to go to the dentist today. advised follow up with dentist and pcp. BAM:  Patient requesting discharge as she is able to see dentist today.  Will d/c per patient's wishes.

## 2022-01-10 NOTE — ED ADULT NURSE REASSESSMENT NOTE - NS ED NURSE REASSESS COMMENT FT1
paige rn. Patient complaining of toothache, wants to get tooth extracted MD spoke to patient about plan of care. Patient denies chest pain.

## 2022-01-10 NOTE — ED PROVIDER NOTE - NSFOLLOWUPINSTRUCTIONS_ED_ALL_ED_FT
You were seen in the ER for a toothache. You received oxycodone for your pain. Your tooth seemed injured, so we advise that you go to the dentist.    Please follow up with your dentist and primary care doctor.    Please return to the ER if you have worsening symptoms including fever, chills, nausea, vomiting, bleeding, drainage, swelling, difficulty swallowing or breathing.

## 2022-01-10 NOTE — ED PROVIDER NOTE - PATIENT PORTAL LINK FT
You can access the FollowMyHealth Patient Portal offered by [facility] by registering at the following website: [email or web address]. By joining [employer]’s FollowMyHealth portal, you will also be able to view your health information using other applications (apps) compatible with our system.

## 2022-01-10 NOTE — ED PROVIDER NOTE - CLINICAL SUMMARY MEDICAL DECISION MAKING FREE TEXT BOX
70yo F w/ hx htn, hld, asthma, remote hx cancer presenting with R toothache. On PE the R upper molar is split with some bleeding. No significant swelling or drainage concerning for abscess. No fevers, afebrile here. Will give pain control.

## 2022-01-10 NOTE — ED PROVIDER NOTE - OBJECTIVE STATEMENT
70yo F w/ hx htn, hld, asthma, breast ca s/p mastectomy in remission presenting with R toothache. Pt started having R upper tooth pain 6 days ago and was prescribed clindamycin which she has been taking. She has taken ibuprofen around the clock last at 11am with no improvement. 2 days ago her pain became worse and she has had some bleeding from the tooth. She has not been able to eat well due to pain. She has no fever, chills, swelling, drainage, difficulty swallowing or breathing. She has a dental appointment tomorrow.

## 2022-11-04 NOTE — ED PROVIDER NOTE - PHYSICAL EXAMINATION
FAMILY HISTORY:  Family history of hypertension  Family history of stomach cancer  Family history of type 2 diabetes mellitus     Gen: AAOx3, non-toxic  Head: NCAT  HEENT: EOMI, oral mucosa moist, normal conjunctiva  Lung: expiratory wheezes in upper lobes, no respiratory distress, speaking in full sentences  CV: RRR, no murmurs, rubs or gallops  Abd: soft, NTND, no guarding, no CVA tenderness  MSK: no visible deformities, ROM of shoulder equal bilaterally, strength of UE 5/5 bilaterally, pain reproduced with neck extension and rotation to the right, no UE sensory deficits bilaterally   Neuro: No focal sensory or motor deficits, CN 2-12 grossly intact, no gait abnormalities.  Skin: Warm, well perfused, no rash  Psych: normal affect.   ~Moreno Lakhwinder PGY2

## 2023-08-14 ENCOUNTER — EMERGENCY (EMERGENCY)
Facility: HOSPITAL | Age: 73
LOS: 0 days | Discharge: HOME | End: 2023-08-14
Attending: STUDENT IN AN ORGANIZED HEALTH CARE EDUCATION/TRAINING PROGRAM
Payer: MEDICARE

## 2023-08-14 VITALS
SYSTOLIC BLOOD PRESSURE: 160 MMHG | RESPIRATION RATE: 12 BRPM | OXYGEN SATURATION: 98 % | TEMPERATURE: 98 F | HEART RATE: 100 BPM | DIASTOLIC BLOOD PRESSURE: 73 MMHG

## 2023-08-14 VITALS
HEIGHT: 65 IN | SYSTOLIC BLOOD PRESSURE: 166 MMHG | WEIGHT: 160.06 LBS | DIASTOLIC BLOOD PRESSURE: 84 MMHG | TEMPERATURE: 98 F | HEART RATE: 104 BPM | RESPIRATION RATE: 18 BRPM | OXYGEN SATURATION: 96 %

## 2023-08-14 DIAGNOSIS — R06.02 SHORTNESS OF BREATH: ICD-10-CM

## 2023-08-14 DIAGNOSIS — Z88.8 ALLERGY STATUS TO OTHER DRUGS, MEDICAMENTS AND BIOLOGICAL SUBSTANCES: ICD-10-CM

## 2023-08-14 DIAGNOSIS — R06.2 WHEEZING: ICD-10-CM

## 2023-08-14 DIAGNOSIS — R05.9 COUGH, UNSPECIFIED: ICD-10-CM

## 2023-08-14 DIAGNOSIS — Z88.0 ALLERGY STATUS TO PENICILLIN: ICD-10-CM

## 2023-08-14 DIAGNOSIS — J45.901 UNSPECIFIED ASTHMA WITH (ACUTE) EXACERBATION: ICD-10-CM

## 2023-08-14 LAB
ANION GAP SERPL CALC-SCNC: 5 MMOL/L — SIGNIFICANT CHANGE UP (ref 5–17)
BASOPHILS # BLD AUTO: 0.02 K/UL — SIGNIFICANT CHANGE UP (ref 0–0.2)
BASOPHILS NFR BLD AUTO: 0.4 % — SIGNIFICANT CHANGE UP (ref 0–2)
BUN SERPL-MCNC: 13 MG/DL — SIGNIFICANT CHANGE UP (ref 7–23)
CALCIUM SERPL-MCNC: 9.6 MG/DL — SIGNIFICANT CHANGE UP (ref 8.5–10.1)
CHLORIDE SERPL-SCNC: 108 MMOL/L — SIGNIFICANT CHANGE UP (ref 96–108)
CO2 SERPL-SCNC: 26 MMOL/L — SIGNIFICANT CHANGE UP (ref 22–31)
CREAT SERPL-MCNC: 0.81 MG/DL — SIGNIFICANT CHANGE UP (ref 0.5–1.3)
EGFR: 77 ML/MIN/1.73M2 — SIGNIFICANT CHANGE UP
EOSINOPHIL # BLD AUTO: 0.02 K/UL — SIGNIFICANT CHANGE UP (ref 0–0.5)
EOSINOPHIL NFR BLD AUTO: 0.4 % — SIGNIFICANT CHANGE UP (ref 0–6)
GLUCOSE SERPL-MCNC: 122 MG/DL — HIGH (ref 70–99)
HCT VFR BLD CALC: 43.7 % — SIGNIFICANT CHANGE UP (ref 34.5–45)
HGB BLD-MCNC: 14.8 G/DL — SIGNIFICANT CHANGE UP (ref 11.5–15.5)
IMM GRANULOCYTES NFR BLD AUTO: 0.2 % — SIGNIFICANT CHANGE UP (ref 0–0.9)
LYMPHOCYTES # BLD AUTO: 1.22 K/UL — SIGNIFICANT CHANGE UP (ref 1–3.3)
LYMPHOCYTES # BLD AUTO: 22.8 % — SIGNIFICANT CHANGE UP (ref 13–44)
MCHC RBC-ENTMCNC: 29.2 PG — SIGNIFICANT CHANGE UP (ref 27–34)
MCHC RBC-ENTMCNC: 33.9 G/DL — SIGNIFICANT CHANGE UP (ref 32–36)
MCV RBC AUTO: 86.4 FL — SIGNIFICANT CHANGE UP (ref 80–100)
MONOCYTES # BLD AUTO: 0.13 K/UL — SIGNIFICANT CHANGE UP (ref 0–0.9)
MONOCYTES NFR BLD AUTO: 2.4 % — SIGNIFICANT CHANGE UP (ref 2–14)
NEUTROPHILS # BLD AUTO: 3.96 K/UL — SIGNIFICANT CHANGE UP (ref 1.8–7.4)
NEUTROPHILS NFR BLD AUTO: 73.8 % — SIGNIFICANT CHANGE UP (ref 43–77)
NRBC # BLD: 0 /100 WBCS — SIGNIFICANT CHANGE UP (ref 0–0)
PLATELET # BLD AUTO: 307 K/UL — SIGNIFICANT CHANGE UP (ref 150–400)
POTASSIUM SERPL-MCNC: 4.3 MMOL/L — SIGNIFICANT CHANGE UP (ref 3.5–5.3)
POTASSIUM SERPL-SCNC: 4.3 MMOL/L — SIGNIFICANT CHANGE UP (ref 3.5–5.3)
RBC # BLD: 5.06 M/UL — SIGNIFICANT CHANGE UP (ref 3.8–5.2)
RBC # FLD: 12.8 % — SIGNIFICANT CHANGE UP (ref 10.3–14.5)
SODIUM SERPL-SCNC: 139 MMOL/L — SIGNIFICANT CHANGE UP (ref 135–145)
TROPONIN I, HIGH SENSITIVITY RESULT: 3.9 NG/L — SIGNIFICANT CHANGE UP
WBC # BLD: 5.36 K/UL — SIGNIFICANT CHANGE UP (ref 3.8–10.5)
WBC # FLD AUTO: 5.36 K/UL — SIGNIFICANT CHANGE UP (ref 3.8–10.5)

## 2023-08-14 PROCEDURE — 99285 EMERGENCY DEPT VISIT HI MDM: CPT

## 2023-08-14 PROCEDURE — 71045 X-RAY EXAM CHEST 1 VIEW: CPT | Mod: 26

## 2023-08-14 RX ORDER — AMLODIPINE BESYLATE 2.5 MG/1
1 TABLET ORAL
Qty: 0 | Refills: 0 | DISCHARGE

## 2023-08-14 RX ORDER — IPRATROPIUM/ALBUTEROL SULFATE 18-103MCG
3 AEROSOL WITH ADAPTER (GRAM) INHALATION
Refills: 0 | Status: COMPLETED | OUTPATIENT
Start: 2023-08-14 | End: 2023-08-14

## 2023-08-14 RX ADMIN — Medication 3 MILLILITER(S): at 17:27

## 2023-08-14 RX ADMIN — Medication 3 MILLILITER(S): at 17:05

## 2023-08-14 RX ADMIN — Medication 50 MILLIGRAM(S): at 17:05

## 2023-08-14 RX ADMIN — Medication 3 MILLILITER(S): at 17:23

## 2023-08-14 NOTE — ED PROVIDER NOTE - OBJECTIVE STATEMENT
73 y/o F hx of asthma no prior intubations per patient presents for shortness of breath for the past 1 day. endorsing feeling an "asthma attack." denies chest pain. endorsing taking prednisone at home w/ significant relief. patient endorsing improved symptoms but not resolved. denies lower extremity edema. denies fever/chills. denies abdominal pain. denies nausea/vomiting. denies cough. denies sick contacts.

## 2023-08-14 NOTE — ED PROVIDER NOTE - PHYSICAL EXAMINATION
General: Well appearing female in no acute distress  HEENT: Normocephalic, atraumatic. Moist mucous membranes. Oropharynx clear. No lymphadenopathy.  Eyes: No scleral icterus. EOMI. TYRONE.  Neck:. Soft and supple. Full ROM without pain. No midline tenderness  Cardiac: Regular rate and regular rhythm. No murmurs, rubs, gallops. Peripheral pulses 2+ and symmetric. No LE edema.  Resp: Lungs CTAB. Speaking in full sentences. +diffuse expiratory wheezing   Abd: Soft, non-tender, non-distended. No guarding or rebound. No scars, masses, or lesions.  Back: Spine midline and non-tender. No CVA tenderness.    Skin: No rashes, abrasions, or lacerations.  Neuro: AO x 3. Moves all extremities symmetrically. Motor strength and sensation grossly intact.

## 2023-08-14 NOTE — ED ADULT NURSE NOTE - OBJECTIVE STATEMENT
Pt axox3 presents to the ED c/o asthma exacerbation that started yesterday and this morning. Pt reports having sob, diff breathing. Denies cp. Pt states that she usually takes montelukast with prednisone. once she takes prednisone she feels much better. States she took prednisone this morning and felt much better. No9t currently c/o sob/cp/diff breathing. Hx b/l breast CA, but states can use both arms for IV placement. Denies allergies. Unknown cause

## 2023-08-14 NOTE — ED PROVIDER NOTE - NS ED ROS FT
General: Denies fever, chills  HEENT: Denies sensory changes, sore throat  Neck: Denies neck pain, neck stiffness  Resp: +coughing, SOB  Cardiovascular: Denies CP, palpitations, LE edema  GI: Denies nausea, vomiting, abdominal pain, diarrhea, constipation, blood in stool  : Denies dysuria, hematuria, frequency, incontinence  MSK: Denies back pain  Neuro: Denies HA, dizziness, numbness, weakness  Skin: Denies rashes.

## 2023-08-14 NOTE — ED PROVIDER NOTE - PATIENT PORTAL LINK FT
You can access the FollowMyHealth Patient Portal offered by [facility] by registering at the following website: [email or web address]. By joining [employer]’s FollowMyHealth portal, you will also be able to view your health information using other applications (apps) compatible with our system.

## 2023-08-14 NOTE — ED ADULT TRIAGE NOTE - CHIEF COMPLAINT QUOTE
patient c/o of difficulty breathing and asthma , started this morning denied chest pain at this time , history of asthma

## 2023-08-14 NOTE — ED PROVIDER NOTE - CLINICAL SUMMARY MEDICAL DECISION MAKING FREE TEXT BOX
71 y/o F hx of asthma no prior intubations per patient presents for shortness of breath for the past 1 day. endorsing feeling an "asthma attack." denies chest pain. endorsing taking prednisone at home w/ significant relief. patient endorsing improved symptoms but not resolved. well appearing, no retractions, not hypoxic. diffuse exp. wheezing. nebs, steroids. will get cxr - r/o pna, low suspicion. labs, trop x1 - low suspicion of acs given no chest pain.     improved symptoms s/p medications, f/u with pulmonologist. Conversation had with patient regarding results of testing. Patient agrees with plan for discharge at this time. Patient agrees to comply with follow up with PCP. Return to ED precautions and discharge instructions given to patient.

## 2023-08-14 NOTE — ED PROVIDER NOTE - NSFOLLOWUPINSTRUCTIONS_ED_ALL_ED_FT
take prednisone once per day for next 4 days.    Asthma    Asthma is a condition in which the airways tighten and narrow, making it difficult to breath. Asthma episodes, also called asthma attacks, range from minor to life-threatening. Symptoms include wheezing, coughing, chest tightness, or shortness of breath. The diagnosis of asthma is made by a review of your medical history and a physical exam, but may involve additional testing. Asthma cannot be cured, but medicines and lifestyle changes can help control it. Avoid triggers of asthma which may include animal dander, pollen, mold, smoke, air pollutants, etc.     SEEK IMMEDIATE MEDICAL CARE IF YOU HAVE ANY OF THE FOLLOWING SYMPTOMS: worsening of symptoms, shortness of breath at rest, chest pain, bluish discoloration to lips or fingertips, lightheadedness/dizziness, or fever.